# Patient Record
Sex: FEMALE | Race: WHITE | Employment: FULL TIME | URBAN - METROPOLITAN AREA
[De-identification: names, ages, dates, MRNs, and addresses within clinical notes are randomized per-mention and may not be internally consistent; named-entity substitution may affect disease eponyms.]

---

## 2017-05-08 ENCOUNTER — OFFICE VISIT (OUTPATIENT)
Dept: OBGYN CLINIC | Facility: CLINIC | Age: 27
End: 2017-05-08

## 2017-05-08 ENCOUNTER — LAB ENCOUNTER (OUTPATIENT)
Dept: LAB | Age: 27
End: 2017-05-08
Attending: OBSTETRICS & GYNECOLOGY
Payer: COMMERCIAL

## 2017-05-08 VITALS
HEART RATE: 96 BPM | BODY MASS INDEX: 25.03 KG/M2 | DIASTOLIC BLOOD PRESSURE: 54 MMHG | WEIGHT: 136 LBS | SYSTOLIC BLOOD PRESSURE: 116 MMHG | HEIGHT: 62 IN

## 2017-05-08 DIAGNOSIS — N92.6 MISSED MENSES: Primary | ICD-10-CM

## 2017-05-08 DIAGNOSIS — Z34.01 ENCOUNTER FOR SUPERVISION OF NORMAL FIRST PREGNANCY IN FIRST TRIMESTER: ICD-10-CM

## 2017-05-08 DIAGNOSIS — N92.6 MISSED MENSES: ICD-10-CM

## 2017-05-08 PROBLEM — Z34.00 SUPERVISION OF NORMAL FIRST PREGNANCY: Status: ACTIVE | Noted: 2017-05-08

## 2017-05-08 PROBLEM — Z34.00 SUPERVISION OF NORMAL FIRST PREGNANCY (HCC): Status: ACTIVE | Noted: 2017-05-08

## 2017-05-08 PROCEDURE — 87389 HIV-1 AG W/HIV-1&-2 AB AG IA: CPT | Performed by: OBSTETRICS & GYNECOLOGY

## 2017-05-08 PROCEDURE — 81002 URINALYSIS NONAUTO W/O SCOPE: CPT | Performed by: OBSTETRICS & GYNECOLOGY

## 2017-05-08 PROCEDURE — 85025 COMPLETE CBC W/AUTO DIFF WBC: CPT | Performed by: OBSTETRICS & GYNECOLOGY

## 2017-05-08 PROCEDURE — 87340 HEPATITIS B SURFACE AG IA: CPT | Performed by: OBSTETRICS & GYNECOLOGY

## 2017-05-08 PROCEDURE — 81025 URINE PREGNANCY TEST: CPT | Performed by: OBSTETRICS & GYNECOLOGY

## 2017-05-08 PROCEDURE — 86901 BLOOD TYPING SEROLOGIC RH(D): CPT | Performed by: OBSTETRICS & GYNECOLOGY

## 2017-05-08 PROCEDURE — 86850 RBC ANTIBODY SCREEN: CPT | Performed by: OBSTETRICS & GYNECOLOGY

## 2017-05-08 PROCEDURE — 86780 TREPONEMA PALLIDUM: CPT | Performed by: OBSTETRICS & GYNECOLOGY

## 2017-05-08 PROCEDURE — 88175 CYTOPATH C/V AUTO FLUID REDO: CPT | Performed by: OBSTETRICS & GYNECOLOGY

## 2017-05-08 PROCEDURE — 86762 RUBELLA ANTIBODY: CPT | Performed by: OBSTETRICS & GYNECOLOGY

## 2017-05-08 RX ORDER — ESCITALOPRAM OXALATE 10 MG/1
TABLET ORAL
Refills: 2 | COMMUNITY
Start: 2017-04-19 | End: 2018-06-06

## 2017-05-08 NOTE — PROGRESS NOTES
Patient desires genetic testing will schedule for a Pap and about 6 weeks she will come back in a month.     She is a history of UTIs and pyelonephritis had her wisdom teeth removed as a grandfather with a stroke her mother had preeclampsia and now has hype

## 2017-06-08 ENCOUNTER — OFFICE VISIT (OUTPATIENT)
Dept: OBGYN CLINIC | Facility: CLINIC | Age: 27
End: 2017-06-08

## 2017-06-08 VITALS
SYSTOLIC BLOOD PRESSURE: 122 MMHG | BODY MASS INDEX: 25.4 KG/M2 | WEIGHT: 138 LBS | DIASTOLIC BLOOD PRESSURE: 74 MMHG | HEIGHT: 62 IN

## 2017-06-08 DIAGNOSIS — Z34.01 ENCOUNTER FOR SUPERVISION OF NORMAL FIRST PREGNANCY IN FIRST TRIMESTER: Primary | ICD-10-CM

## 2017-06-08 DIAGNOSIS — Z13.79 ENCOUNTER FOR GENETIC SCREENING: ICD-10-CM

## 2017-06-08 PROCEDURE — 76816 OB US FOLLOW-UP PER FETUS: CPT | Performed by: OBSTETRICS & GYNECOLOGY

## 2017-06-08 NOTE — PROGRESS NOTES
JOSÉ ANTONIO-A' tired. No fetal movement. Nausea and vomiting today only. No fetal movement. Cystic fibrosis testing today.   Ultrasound with viable pregnancy

## 2017-06-22 NOTE — PROGRESS NOTES
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

## 2017-06-23 ENCOUNTER — TELEPHONE (OUTPATIENT)
Dept: OBGYN CLINIC | Facility: CLINIC | Age: 27
End: 2017-06-23

## 2017-06-23 ENCOUNTER — ULTRASOUND ENCOUNTER (OUTPATIENT)
Dept: OBGYN CLINIC | Facility: CLINIC | Age: 27
End: 2017-06-23

## 2017-06-23 DIAGNOSIS — Z34.01 ENCOUNTER FOR SUPERVISION OF NORMAL FIRST PREGNANCY IN FIRST TRIMESTER: ICD-10-CM

## 2017-06-23 DIAGNOSIS — O09.511 ADVANCED MATERNAL AGE, 1ST PREGNANCY, FIRST TRIMESTER: Primary | ICD-10-CM

## 2017-06-23 DIAGNOSIS — Z3A.14 14 WEEKS GESTATION OF PREGNANCY: Primary | ICD-10-CM

## 2017-06-23 PROCEDURE — 76805 OB US >/= 14 WKS SNGL FETUS: CPT | Performed by: OBSTETRICS & GYNECOLOGY

## 2017-06-27 NOTE — TELEPHONE ENCOUNTER
Returned pt's call regarding the name of genetic counselor Tressa Borrego [phone]  Pt would like for a call back to repeat the abnormal results to her, please

## 2017-06-27 NOTE — TELEPHONE ENCOUNTER
Patient requests the test results to be mail to her home address. Also, patient informed that she does need repeat Counsyl blood work for Informed Pregnancy Screen during next visit. Patient verbalizes understanding.

## 2017-07-08 ENCOUNTER — OFFICE VISIT (OUTPATIENT)
Dept: OBGYN CLINIC | Facility: CLINIC | Age: 27
End: 2017-07-08

## 2017-07-08 VITALS
SYSTOLIC BLOOD PRESSURE: 110 MMHG | HEIGHT: 62 IN | WEIGHT: 139 LBS | DIASTOLIC BLOOD PRESSURE: 60 MMHG | BODY MASS INDEX: 25.58 KG/M2

## 2017-07-08 DIAGNOSIS — Z34.02 ENCOUNTER FOR SUPERVISION OF NORMAL FIRST PREGNANCY IN SECOND TRIMESTER: Primary | ICD-10-CM

## 2017-07-08 RX ORDER — LISDEXAMFETAMINE DIMESYLATE 50 MG
CAPSULE ORAL
Refills: 0 | COMMUNITY
Start: 2017-05-05 | End: 2017-07-08

## 2017-07-10 ENCOUNTER — MED REC SCAN ONLY (OUTPATIENT)
Dept: OBGYN CLINIC | Facility: CLINIC | Age: 27
End: 2017-07-10

## 2017-08-08 ENCOUNTER — APPOINTMENT (OUTPATIENT)
Dept: OBGYN CLINIC | Facility: CLINIC | Age: 27
End: 2017-08-08

## 2017-08-08 ENCOUNTER — OFFICE VISIT (OUTPATIENT)
Dept: OBGYN CLINIC | Facility: CLINIC | Age: 27
End: 2017-08-08

## 2017-08-08 VITALS
SYSTOLIC BLOOD PRESSURE: 110 MMHG | HEIGHT: 62 IN | BODY MASS INDEX: 26.87 KG/M2 | DIASTOLIC BLOOD PRESSURE: 52 MMHG | WEIGHT: 146 LBS

## 2017-08-08 DIAGNOSIS — Z34.02 ENCOUNTER FOR SUPERVISION OF NORMAL FIRST PREGNANCY IN SECOND TRIMESTER: Primary | ICD-10-CM

## 2017-08-08 DIAGNOSIS — IMO0002 EVALUATE ANATOMY NOT SEEN ON PRIOR SONOGRAM: Primary | ICD-10-CM

## 2017-08-08 PROCEDURE — 76805 OB US >/= 14 WKS SNGL FETUS: CPT | Performed by: OBSTETRICS & GYNECOLOGY

## 2017-08-08 NOTE — PROGRESS NOTES
Ultrasound 19 weeks 5 days cervical length 3.1 suboptimal heart views. MFM consult for heart views. Prepared childbirth and breast-feeding classes were discussed. She will be here for sugar test next month.

## 2017-08-11 ENCOUNTER — MED REC SCAN ONLY (OUTPATIENT)
Dept: OBGYN CLINIC | Facility: CLINIC | Age: 27
End: 2017-08-11

## 2017-08-17 ENCOUNTER — OFFICE VISIT (OUTPATIENT)
Dept: PERINATAL CARE | Facility: HOSPITAL | Age: 27
End: 2017-08-17
Attending: OBSTETRICS & GYNECOLOGY
Payer: COMMERCIAL

## 2017-08-17 VITALS
HEART RATE: 76 BPM | DIASTOLIC BLOOD PRESSURE: 60 MMHG | WEIGHT: 146 LBS | SYSTOLIC BLOOD PRESSURE: 112 MMHG | BODY MASS INDEX: 27 KG/M2

## 2017-08-17 DIAGNOSIS — O28.3 FETAL ECHOGENIC INTRACARDIAC FOCUS ON PRENATAL ULTRASOUND: Primary | ICD-10-CM

## 2017-08-17 PROCEDURE — 76811 OB US DETAILED SNGL FETUS: CPT

## 2017-08-17 PROCEDURE — 99243 OFF/OP CNSLTJ NEW/EST LOW 30: CPT

## 2017-08-17 NOTE — PROGRESS NOTES
Outpatient Maternal-Fetal Medicine Consultation    Dear Dr. Arlena Hatchet,    Thank you for requesting ultrasound evaluation and maternal fetal medicine consultation on your patient Dawood Franco.   As you are aware she is a 24year old female with a Nam p detailed report. History: Age: 24 years.   ____________________________________________________________________________  Dating:  LMP: 03/25/2017 EDC: 12/30/2017 GA by LMP: 20w5d  Current Scan on: 08/17/2017 EDC: 12/24/2017 GA by current scan: 21w4d  The for aneuploidy are seen. The limitations of ultrasound were discussed. The patient understands that ultrasound cannot rule out all structural and chromosomal abnormalities.     ____________________________________________________________________________  [facility] Please do not hesitate to contact me if additional questions or concerns arise. Manuela Geller M.D. The majority of the time (>50%) was spent in review of records, consultation and coordination of care. Our discussion is summarized above.  The approxi

## 2017-09-05 ENCOUNTER — LAB ENCOUNTER (OUTPATIENT)
Dept: LAB | Age: 27
End: 2017-09-05
Attending: OBSTETRICS & GYNECOLOGY
Payer: COMMERCIAL

## 2017-09-05 ENCOUNTER — OFFICE VISIT (OUTPATIENT)
Dept: OBGYN CLINIC | Facility: CLINIC | Age: 27
End: 2017-09-05

## 2017-09-05 VITALS
BODY MASS INDEX: 28.16 KG/M2 | SYSTOLIC BLOOD PRESSURE: 116 MMHG | WEIGHT: 153 LBS | HEIGHT: 62 IN | DIASTOLIC BLOOD PRESSURE: 60 MMHG

## 2017-09-05 DIAGNOSIS — O28.3 FETAL ECHOGENIC INTRACARDIAC FOCUS ON PRENATAL ULTRASOUND: ICD-10-CM

## 2017-09-05 DIAGNOSIS — Z34.02 ENCOUNTER FOR SUPERVISION OF NORMAL FIRST PREGNANCY IN SECOND TRIMESTER: Primary | ICD-10-CM

## 2017-09-05 DIAGNOSIS — Z34.02 ENCOUNTER FOR SUPERVISION OF NORMAL FIRST PREGNANCY IN SECOND TRIMESTER: ICD-10-CM

## 2017-09-05 DIAGNOSIS — Z23 NEED FOR VACCINATION: ICD-10-CM

## 2017-09-05 PROBLEM — F41.9 ANXIETY: Status: ACTIVE | Noted: 2017-09-05

## 2017-09-05 LAB
BASOPHILS # BLD AUTO: 0.04 X10(3) UL (ref 0–0.1)
BASOPHILS NFR BLD AUTO: 0.3 %
EOSINOPHIL # BLD AUTO: 0.17 X10(3) UL (ref 0–0.3)
EOSINOPHIL NFR BLD AUTO: 1.3 %
ERYTHROCYTE [DISTWIDTH] IN BLOOD BY AUTOMATED COUNT: 12.5 % (ref 11.5–16)
GLUCOSE 1H P GLC SERPL-MCNC: 80 MG/DL
HCT VFR BLD AUTO: 37.2 % (ref 34–50)
HGB BLD-MCNC: 12.4 G/DL (ref 12–16)
IMMATURE GRANULOCYTE COUNT: 0.06 X10(3) UL (ref 0–1)
IMMATURE GRANULOCYTE RATIO %: 0.5 %
LYMPHOCYTES # BLD AUTO: 3.34 X10(3) UL (ref 0.9–4)
LYMPHOCYTES NFR BLD AUTO: 25.7 %
MCH RBC QN AUTO: 29.4 PG (ref 27–33.2)
MCHC RBC AUTO-ENTMCNC: 33.3 G/DL (ref 31–37)
MCV RBC AUTO: 88.2 FL (ref 81–100)
MONOCYTES # BLD AUTO: 1.12 X10(3) UL (ref 0.1–0.6)
MONOCYTES NFR BLD AUTO: 8.6 %
NEUTROPHIL ABS PRELIM: 8.27 X10 (3) UL (ref 1.3–6.7)
NEUTROPHILS # BLD AUTO: 8.27 X10(3) UL (ref 1.3–6.7)
NEUTROPHILS NFR BLD AUTO: 63.6 %
PLATELET # BLD AUTO: 369 10(3)UL (ref 150–450)
RBC # BLD AUTO: 4.22 X10(6)UL (ref 3.8–5.1)
RED CELL DISTRIBUTION WIDTH-SD: 40.1 FL (ref 35.1–46.3)
WBC # BLD AUTO: 13 X10(3) UL (ref 4–13)

## 2017-09-05 PROCEDURE — 82950 GLUCOSE TEST: CPT | Performed by: OBSTETRICS & GYNECOLOGY

## 2017-09-05 PROCEDURE — 90471 IMMUNIZATION ADMIN: CPT | Performed by: OBSTETRICS & GYNECOLOGY

## 2017-09-05 PROCEDURE — 85025 COMPLETE CBC W/AUTO DIFF WBC: CPT | Performed by: OBSTETRICS & GYNECOLOGY

## 2017-09-05 PROCEDURE — 90686 IIV4 VACC NO PRSV 0.5 ML IM: CPT | Performed by: OBSTETRICS & GYNECOLOGY

## 2017-09-05 NOTE — PROGRESS NOTES
Breast-feeding and Lamaze were discussed. She is doing her glucose today. Circumcision discussed. She will get a flu shot today.

## 2017-09-11 ENCOUNTER — MED REC SCAN ONLY (OUTPATIENT)
Dept: OBGYN CLINIC | Facility: CLINIC | Age: 27
End: 2017-09-11

## 2017-09-22 ENCOUNTER — NURSE ONLY (OUTPATIENT)
Dept: OBGYN CLINIC | Facility: CLINIC | Age: 27
End: 2017-09-22

## 2017-09-22 DIAGNOSIS — N39.0 URINARY TRACT INFECTION WITHOUT HEMATURIA, SITE UNSPECIFIED: Primary | ICD-10-CM

## 2017-09-22 LAB
APPEARANCE: CLEAR
MULTISTIX LOT#: NORMAL NUMERIC
NITRITE, URINE: POSITIVE
OCCULT BLOOD: NEGATIVE
PH, URINE: 6.5 (ref 4.5–8)
SPECIFIC GRAVITY: 1.02 (ref 1–1.03)
URINE-COLOR: YELLOW
UROBILINOGEN,SEMI-QN: 0.2 MG/DL (ref 0–1.9)

## 2017-09-22 PROCEDURE — 87086 URINE CULTURE/COLONY COUNT: CPT | Performed by: OBSTETRICS & GYNECOLOGY

## 2017-09-22 PROCEDURE — 81002 URINALYSIS NONAUTO W/O SCOPE: CPT | Performed by: OBSTETRICS & GYNECOLOGY

## 2017-09-22 PROCEDURE — 99212 OFFICE O/P EST SF 10 MIN: CPT | Performed by: OBSTETRICS & GYNECOLOGY

## 2017-09-22 RX ORDER — NITROFURANTOIN 25; 75 MG/1; MG/1
100 CAPSULE ORAL 2 TIMES DAILY
Qty: 14 CAPSULE | Refills: 0 | Status: SHIPPED | OUTPATIENT
Start: 2017-09-22 | End: 2017-10-31

## 2017-10-03 ENCOUNTER — OFFICE VISIT (OUTPATIENT)
Dept: OBGYN CLINIC | Facility: CLINIC | Age: 27
End: 2017-10-03

## 2017-10-03 VITALS
SYSTOLIC BLOOD PRESSURE: 116 MMHG | WEIGHT: 156 LBS | BODY MASS INDEX: 28.71 KG/M2 | HEIGHT: 62 IN | DIASTOLIC BLOOD PRESSURE: 66 MMHG

## 2017-10-03 DIAGNOSIS — Z34.03 ENCOUNTER FOR SUPERVISION OF NORMAL FIRST PREGNANCY IN THIRD TRIMESTER: Primary | ICD-10-CM

## 2017-10-03 PROCEDURE — 90715 TDAP VACCINE 7 YRS/> IM: CPT | Performed by: OBSTETRICS & GYNECOLOGY

## 2017-10-03 PROCEDURE — 90471 IMMUNIZATION ADMIN: CPT | Performed by: OBSTETRICS & GYNECOLOGY

## 2017-10-03 NOTE — PROGRESS NOTES
Urine culture negative at 18-24 hours. Rh+.flu  Shot has been giv  Desires circumcision. Tdap given. 2 weeksen.

## 2017-10-17 ENCOUNTER — OFFICE VISIT (OUTPATIENT)
Dept: OBGYN CLINIC | Facility: CLINIC | Age: 27
End: 2017-10-17

## 2017-10-17 VITALS
HEIGHT: 62 IN | SYSTOLIC BLOOD PRESSURE: 116 MMHG | WEIGHT: 161 LBS | DIASTOLIC BLOOD PRESSURE: 68 MMHG | BODY MASS INDEX: 29.63 KG/M2

## 2017-10-17 DIAGNOSIS — Z34.03 ENCOUNTER FOR SUPERVISION OF NORMAL FIRST PREGNANCY IN THIRD TRIMESTER: Primary | ICD-10-CM

## 2017-10-17 NOTE — PROGRESS NOTES
Various pediatricians. Understands she needs a car seat preregistration was discussed. classes were discussed.

## 2017-10-25 NOTE — PROGRESS NOTES
Georgia Mustafa    Dear Dr. Joel Booth    Thank you for requesting ultrasound evaluation and maternal fetal medicine consultation on your patient Arianne Sequeira.   As you are aware she is a 24year old female  with a Singlet Fetal Anatomy:  Visualized with normal appearance: 4 chamber heart and great vessels, bladder. Brain: Visualized and normal appearance: cerebellum. Gastrointestinal Tract: stomach visible. Summary of Ultrasound Findings:  Impression:  The fetal g

## 2017-10-26 ENCOUNTER — OFFICE VISIT (OUTPATIENT)
Dept: PERINATAL CARE | Facility: HOSPITAL | Age: 27
End: 2017-10-26
Attending: OBSTETRICS & GYNECOLOGY
Payer: COMMERCIAL

## 2017-10-26 VITALS
HEART RATE: 73 BPM | BODY MASS INDEX: 29 KG/M2 | WEIGHT: 161 LBS | DIASTOLIC BLOOD PRESSURE: 64 MMHG | SYSTOLIC BLOOD PRESSURE: 116 MMHG

## 2017-10-26 DIAGNOSIS — O28.3 FETAL ECHOGENIC INTRACARDIAC FOCUS ON PRENATAL ULTRASOUND: ICD-10-CM

## 2017-10-26 PROCEDURE — 76805 OB US >/= 14 WKS SNGL FETUS: CPT | Performed by: OBSTETRICS & GYNECOLOGY

## 2017-10-26 PROCEDURE — 76819 FETAL BIOPHYS PROFIL W/O NST: CPT | Performed by: OBSTETRICS & GYNECOLOGY

## 2017-10-26 PROCEDURE — 99214 OFFICE O/P EST MOD 30 MIN: CPT | Performed by: OBSTETRICS & GYNECOLOGY

## 2017-10-31 ENCOUNTER — OFFICE VISIT (OUTPATIENT)
Dept: OBGYN CLINIC | Facility: CLINIC | Age: 27
End: 2017-10-31

## 2017-10-31 VITALS
HEIGHT: 62 IN | SYSTOLIC BLOOD PRESSURE: 126 MMHG | DIASTOLIC BLOOD PRESSURE: 62 MMHG | WEIGHT: 161 LBS | BODY MASS INDEX: 29.63 KG/M2

## 2017-10-31 DIAGNOSIS — Z34.03 ENCOUNTER FOR SUPERVISION OF NORMAL FIRST PREGNANCY IN THIRD TRIMESTER: Primary | ICD-10-CM

## 2017-11-14 ENCOUNTER — OFFICE VISIT (OUTPATIENT)
Dept: OBGYN CLINIC | Facility: CLINIC | Age: 27
End: 2017-11-14

## 2017-11-14 VITALS
WEIGHT: 165 LBS | SYSTOLIC BLOOD PRESSURE: 120 MMHG | HEIGHT: 62 IN | DIASTOLIC BLOOD PRESSURE: 58 MMHG | BODY MASS INDEX: 30.36 KG/M2

## 2017-11-14 DIAGNOSIS — Z34.03 ENCOUNTER FOR SUPERVISION OF NORMAL FIRST PREGNANCY IN THIRD TRIMESTER: Primary | ICD-10-CM

## 2017-11-28 ENCOUNTER — OFFICE VISIT (OUTPATIENT)
Dept: OBGYN CLINIC | Facility: CLINIC | Age: 27
End: 2017-11-28

## 2017-11-28 VITALS
SYSTOLIC BLOOD PRESSURE: 122 MMHG | BODY MASS INDEX: 30.73 KG/M2 | HEIGHT: 62 IN | DIASTOLIC BLOOD PRESSURE: 68 MMHG | WEIGHT: 167 LBS

## 2017-11-28 DIAGNOSIS — Z34.03 ENCOUNTER FOR SUPERVISION OF NORMAL FIRST PREGNANCY IN THIRD TRIMESTER: Primary | ICD-10-CM

## 2017-11-28 PROCEDURE — 87081 CULTURE SCREEN ONLY: CPT | Performed by: OBSTETRICS & GYNECOLOGY

## 2017-11-28 PROCEDURE — 87653 STREP B DNA AMP PROBE: CPT | Performed by: OBSTETRICS & GYNECOLOGY

## 2017-12-05 ENCOUNTER — OFFICE VISIT (OUTPATIENT)
Dept: OBGYN CLINIC | Facility: CLINIC | Age: 27
End: 2017-12-05

## 2017-12-05 VITALS
DIASTOLIC BLOOD PRESSURE: 60 MMHG | BODY MASS INDEX: 31.1 KG/M2 | SYSTOLIC BLOOD PRESSURE: 124 MMHG | WEIGHT: 169 LBS | HEIGHT: 62 IN

## 2017-12-05 DIAGNOSIS — Z34.03 ENCOUNTER FOR SUPERVISION OF NORMAL FIRST PREGNANCY IN THIRD TRIMESTER: Primary | ICD-10-CM

## 2017-12-12 ENCOUNTER — OFFICE VISIT (OUTPATIENT)
Dept: OBGYN CLINIC | Facility: CLINIC | Age: 27
End: 2017-12-12

## 2017-12-12 VITALS
HEIGHT: 62 IN | WEIGHT: 171 LBS | BODY MASS INDEX: 31.47 KG/M2 | SYSTOLIC BLOOD PRESSURE: 102 MMHG | DIASTOLIC BLOOD PRESSURE: 62 MMHG

## 2017-12-12 DIAGNOSIS — O28.3 FETAL ECHOGENIC INTRACARDIAC FOCUS ON PRENATAL ULTRASOUND: Primary | ICD-10-CM

## 2017-12-16 ENCOUNTER — HOSPITAL ENCOUNTER (INPATIENT)
Facility: HOSPITAL | Age: 27
LOS: 2 days | Discharge: HOME | End: 2017-12-18
Attending: OBSTETRICS & GYNECOLOGY | Admitting: OBSTETRICS & GYNECOLOGY
Payer: COMMERCIAL

## 2017-12-16 ENCOUNTER — ANESTHESIA (OUTPATIENT)
Dept: OBGYN UNIT | Facility: HOSPITAL | Age: 27
End: 2017-12-16
Payer: COMMERCIAL

## 2017-12-16 ENCOUNTER — ANESTHESIA EVENT (OUTPATIENT)
Dept: OBGYN UNIT | Facility: HOSPITAL | Age: 27
End: 2017-12-16
Payer: COMMERCIAL

## 2017-12-16 ENCOUNTER — SURGERY (OUTPATIENT)
Age: 27
End: 2017-12-16

## 2017-12-16 PROBLEM — Z34.90 PREGNANCY: Status: ACTIVE | Noted: 2017-12-16

## 2017-12-16 PROBLEM — Z34.90 PREGNANCY (HCC): Status: ACTIVE | Noted: 2017-12-16

## 2017-12-16 PROCEDURE — 59514 CESAREAN DELIVERY ONLY: CPT | Performed by: OBSTETRICS & GYNECOLOGY

## 2017-12-16 PROCEDURE — 59510 CESAREAN DELIVERY: CPT | Performed by: OBSTETRICS & GYNECOLOGY

## 2017-12-16 RX ORDER — CLINDAMYCIN PHOSPHATE 900 MG/50ML
900 INJECTION INTRAVENOUS ONCE
Status: DISCONTINUED | OUTPATIENT
Start: 2017-12-16 | End: 2017-12-16

## 2017-12-16 RX ORDER — DEXTROSE, SODIUM CHLORIDE, SODIUM LACTATE, POTASSIUM CHLORIDE, AND CALCIUM CHLORIDE 5; .6; .31; .03; .02 G/100ML; G/100ML; G/100ML; G/100ML; G/100ML
INJECTION, SOLUTION INTRAVENOUS CONTINUOUS
Status: DISCONTINUED | OUTPATIENT
Start: 2017-12-16 | End: 2017-12-18

## 2017-12-16 RX ORDER — GENTAMICIN SULFATE 40 MG/ML
5 INJECTION, SOLUTION INTRAMUSCULAR; INTRAVENOUS ONCE
Status: DISCONTINUED | OUTPATIENT
Start: 2017-12-16 | End: 2017-12-18

## 2017-12-16 RX ORDER — GENTAMICIN SULFATE 40 MG/ML
5 INJECTION, SOLUTION INTRAMUSCULAR; INTRAVENOUS ONCE
Status: DISCONTINUED | OUTPATIENT
Start: 2017-12-16 | End: 2017-12-16

## 2017-12-16 RX ORDER — GENTAMICIN SULFATE 40 MG/ML
INJECTION, SOLUTION INTRAMUSCULAR; INTRAVENOUS
Status: DISCONTINUED
Start: 2017-12-16 | End: 2017-12-16

## 2017-12-16 RX ORDER — HYDROMORPHONE HYDROCHLORIDE 1 MG/ML
0.4 INJECTION, SOLUTION INTRAMUSCULAR; INTRAVENOUS; SUBCUTANEOUS EVERY 2 HOUR PRN
Status: DISPENSED | OUTPATIENT
Start: 2017-12-16 | End: 2017-12-17

## 2017-12-16 RX ORDER — MORPHINE SULFATE 0.5 MG/ML
2 INJECTION, SOLUTION EPIDURAL; INTRATHECAL; INTRAVENOUS ONCE
Status: DISCONTINUED | OUTPATIENT
Start: 2017-12-16 | End: 2017-12-18

## 2017-12-16 RX ORDER — HYDROCODONE BITARTRATE AND ACETAMINOPHEN 5; 325 MG/1; MG/1
1 TABLET ORAL EVERY 4 HOURS PRN
Status: DISCONTINUED | OUTPATIENT
Start: 2017-12-16 | End: 2017-12-18

## 2017-12-16 RX ORDER — DEXTROSE, SODIUM CHLORIDE, SODIUM LACTATE, POTASSIUM CHLORIDE, AND CALCIUM CHLORIDE 5; .6; .31; .03; .02 G/100ML; G/100ML; G/100ML; G/100ML; G/100ML
INJECTION, SOLUTION INTRAVENOUS AS NEEDED
Status: DISCONTINUED | OUTPATIENT
Start: 2017-12-16 | End: 2017-12-18

## 2017-12-16 RX ORDER — KETOROLAC TROMETHAMINE 30 MG/ML
30 INJECTION, SOLUTION INTRAMUSCULAR; INTRAVENOUS EVERY 6 HOURS PRN
Status: COMPLETED | OUTPATIENT
Start: 2017-12-16 | End: 2017-12-17

## 2017-12-16 RX ORDER — DIPHENHYDRAMINE HYDROCHLORIDE 50 MG/ML
12.5 INJECTION INTRAMUSCULAR; INTRAVENOUS EVERY 4 HOURS PRN
Status: DISCONTINUED | OUTPATIENT
Start: 2017-12-16 | End: 2017-12-18

## 2017-12-16 RX ORDER — NALOXONE HYDROCHLORIDE 0.4 MG/ML
0.08 INJECTION, SOLUTION INTRAMUSCULAR; INTRAVENOUS; SUBCUTANEOUS
Status: ACTIVE | OUTPATIENT
Start: 2017-12-16 | End: 2017-12-17

## 2017-12-16 RX ORDER — IBUPROFEN 600 MG/1
600 TABLET ORAL ONCE AS NEEDED
Status: DISCONTINUED | OUTPATIENT
Start: 2017-12-16 | End: 2017-12-18

## 2017-12-16 RX ORDER — ONDANSETRON 2 MG/ML
4 INJECTION INTRAMUSCULAR; INTRAVENOUS ONCE AS NEEDED
Status: DISCONTINUED | OUTPATIENT
Start: 2017-12-16 | End: 2017-12-16 | Stop reason: HOSPADM

## 2017-12-16 RX ORDER — TRISODIUM CITRATE DIHYDRATE AND CITRIC ACID MONOHYDRATE 500; 334 MG/5ML; MG/5ML
30 SOLUTION ORAL ONCE
Status: COMPLETED | OUTPATIENT
Start: 2017-12-16 | End: 2017-12-16

## 2017-12-16 RX ORDER — DOCUSATE SODIUM 100 MG/1
100 CAPSULE, LIQUID FILLED ORAL
Status: DISCONTINUED | OUTPATIENT
Start: 2017-12-17 | End: 2017-12-18

## 2017-12-16 RX ORDER — KETOROLAC TROMETHAMINE 30 MG/ML
30 INJECTION, SOLUTION INTRAMUSCULAR; INTRAVENOUS EVERY 6 HOURS PRN
Status: DISPENSED | OUTPATIENT
Start: 2017-12-16 | End: 2017-12-17

## 2017-12-16 RX ORDER — SODIUM CHLORIDE, SODIUM LACTATE, POTASSIUM CHLORIDE, CALCIUM CHLORIDE 600; 310; 30; 20 MG/100ML; MG/100ML; MG/100ML; MG/100ML
INJECTION, SOLUTION INTRAVENOUS CONTINUOUS
Status: DISCONTINUED | OUTPATIENT
Start: 2017-12-16 | End: 2017-12-18

## 2017-12-16 RX ORDER — DIPHENHYDRAMINE HCL 25 MG
25 CAPSULE ORAL EVERY 4 HOURS PRN
Status: DISCONTINUED | OUTPATIENT
Start: 2017-12-16 | End: 2017-12-18

## 2017-12-16 RX ORDER — BISACODYL 10 MG
10 SUPPOSITORY, RECTAL RECTAL
Status: DISCONTINUED | OUTPATIENT
Start: 2017-12-16 | End: 2017-12-18

## 2017-12-16 RX ORDER — SODIUM CHLORIDE 9 MG/ML
100 INJECTION, SOLUTION INTRAVENOUS ONCE
Status: DISCONTINUED | OUTPATIENT
Start: 2017-12-16 | End: 2017-12-16

## 2017-12-16 RX ORDER — TERBUTALINE SULFATE 1 MG/ML
0.25 INJECTION, SOLUTION SUBCUTANEOUS AS NEEDED
Status: DISCONTINUED | OUTPATIENT
Start: 2017-12-16 | End: 2017-12-18

## 2017-12-16 RX ORDER — CLINDAMYCIN PHOSPHATE 900 MG/50ML
900 INJECTION INTRAVENOUS ONCE
Status: DISCONTINUED | OUTPATIENT
Start: 2017-12-16 | End: 2017-12-18

## 2017-12-16 RX ORDER — ESCITALOPRAM OXALATE 10 MG/1
10 TABLET ORAL EVERY MORNING
Status: DISCONTINUED | OUTPATIENT
Start: 2017-12-16 | End: 2017-12-18

## 2017-12-16 RX ORDER — DIPHENHYDRAMINE HYDROCHLORIDE 50 MG/ML
25 INJECTION INTRAMUSCULAR; INTRAVENOUS ONCE AS NEEDED
Status: DISCONTINUED | OUTPATIENT
Start: 2017-12-16 | End: 2017-12-16 | Stop reason: HOSPADM

## 2017-12-16 RX ORDER — EPHEDRINE SULFATE 50 MG/ML
5 INJECTION, SOLUTION INTRAVENOUS AS NEEDED
Status: DISCONTINUED | OUTPATIENT
Start: 2017-12-16 | End: 2017-12-18

## 2017-12-16 RX ORDER — KETOROLAC TROMETHAMINE 30 MG/ML
30 INJECTION, SOLUTION INTRAMUSCULAR; INTRAVENOUS ONCE AS NEEDED
Status: COMPLETED | OUTPATIENT
Start: 2017-12-16 | End: 2017-12-16

## 2017-12-16 RX ORDER — HYDROMORPHONE HYDROCHLORIDE 1 MG/ML
0.4 INJECTION, SOLUTION INTRAMUSCULAR; INTRAVENOUS; SUBCUTANEOUS EVERY 5 MIN PRN
Status: DISCONTINUED | OUTPATIENT
Start: 2017-12-16 | End: 2017-12-16 | Stop reason: HOSPADM

## 2017-12-16 RX ORDER — SODIUM CHLORIDE 9 MG/ML
100 INJECTION, SOLUTION INTRAVENOUS ONCE
Status: DISCONTINUED | OUTPATIENT
Start: 2017-12-16 | End: 2017-12-18

## 2017-12-16 RX ORDER — CLINDAMYCIN PHOSPHATE 900 MG/50ML
INJECTION INTRAVENOUS
Status: DISCONTINUED | OUTPATIENT
Start: 2017-12-16 | End: 2017-12-18

## 2017-12-16 RX ORDER — SIMETHICONE 80 MG
80 TABLET,CHEWABLE ORAL 3 TIMES DAILY PRN
Status: DISCONTINUED | OUTPATIENT
Start: 2017-12-16 | End: 2017-12-18

## 2017-12-16 RX ORDER — HYDROCODONE BITARTRATE AND ACETAMINOPHEN 10; 325 MG/1; MG/1
1 TABLET ORAL EVERY 4 HOURS PRN
Status: DISCONTINUED | OUTPATIENT
Start: 2017-12-16 | End: 2017-12-18

## 2017-12-16 RX ORDER — ZOLPIDEM TARTRATE 5 MG/1
5 TABLET ORAL NIGHTLY PRN
Status: DISCONTINUED | OUTPATIENT
Start: 2017-12-16 | End: 2017-12-18

## 2017-12-16 RX ORDER — NALBUPHINE HCL 10 MG/ML
2.5 AMPUL (ML) INJECTION
Status: DISCONTINUED | OUTPATIENT
Start: 2017-12-16 | End: 2017-12-18

## 2017-12-16 RX ORDER — ONDANSETRON 2 MG/ML
4 INJECTION INTRAMUSCULAR; INTRAVENOUS EVERY 6 HOURS PRN
Status: DISCONTINUED | OUTPATIENT
Start: 2017-12-16 | End: 2017-12-18

## 2017-12-16 RX ORDER — KETOROLAC TROMETHAMINE 30 MG/ML
INJECTION, SOLUTION INTRAMUSCULAR; INTRAVENOUS
Status: COMPLETED
Start: 2017-12-16 | End: 2017-12-16

## 2017-12-16 RX ORDER — NALBUPHINE HCL 10 MG/ML
2.5 AMPUL (ML) INJECTION
Status: DISCONTINUED | OUTPATIENT
Start: 2017-12-16 | End: 2017-12-16 | Stop reason: HOSPADM

## 2017-12-16 RX ORDER — NALBUPHINE HCL 10 MG/ML
2.5 AMPUL (ML) INJECTION EVERY 4 HOURS PRN
Status: DISCONTINUED | OUTPATIENT
Start: 2017-12-16 | End: 2017-12-18

## 2017-12-16 RX ORDER — GENTAMICIN SULFATE 80 MG/100ML
INJECTION, SOLUTION INTRAVENOUS
Status: DISCONTINUED | OUTPATIENT
Start: 2017-12-16 | End: 2017-12-18

## 2017-12-16 RX ORDER — TRISODIUM CITRATE DIHYDRATE AND CITRIC ACID MONOHYDRATE 500; 334 MG/5ML; MG/5ML
30 SOLUTION ORAL AS NEEDED
Status: DISCONTINUED | OUTPATIENT
Start: 2017-12-16 | End: 2017-12-18

## 2017-12-16 RX ORDER — IBUPROFEN 600 MG/1
600 TABLET ORAL EVERY 6 HOURS SCHEDULED
Status: DISCONTINUED | OUTPATIENT
Start: 2017-12-17 | End: 2017-12-18

## 2017-12-16 NOTE — H&P
[address] [facility] Patient Status:  Inpatient    5/3/1990 MRN [id number]   Location [facility] Attending Juan Desouza MD   Hosp Day # 0 PCP None Pcp     Subjective:   GiPo SROM . 4 cm.  Having c

## 2017-12-16 NOTE — PROGRESS NOTES
Patient admitted via cart  Safety precautions initiated. Bed in low position. Call light is within reach. Hugs and Kisses security tag in place on infant and patient. ID bands matched with infant and parents  Oriented to room.   Reviewed plan of care

## 2017-12-16 NOTE — PROGRESS NOTES
Deep variable decels 1 dose brethine given. 7 cm/-1 . Asynclitic. To C/S for fetal intolerance.  To labor

## 2017-12-16 NOTE — ANESTHESIA POSTPROCEDURE EVALUATION
[address] [facility] Patient Status:  Inpatient   Age/Gender 24year old female MRN [id number]   Location [address] [facility] Attending Norman Moya MD   Hosp Day # 0 PCP None Pcp       Anesthesia Post-op Note    Procedure(s):

## 2017-12-16 NOTE — PROGRESS NOTES
Pt is a 24year old female admitted to TRG2/TRG2-A. Patient presents with:  R/o Labor  R/o Rom     Pt is  38w0d intra-uterine pregnancy. History obtained, consents signed. Oriented to room, staff, and plan of care.

## 2017-12-16 NOTE — ANESTHESIA PREPROCEDURE EVALUATION
PRE-OP EVALUATION    Patient Name: Darrion Desouza    Pre-op Diagnosis: term intrauterine pregnancy in labor; fetal intolerance of labor    Procedure(s):      Surgeon(s) and Role:     * Kayley Jones MD - Primary     * Russel Huffman MD - Assisting Surgeon Cardiovascular                                                       Endo/Other                                  Pulmonary                           Neuro/Psych                     (+) psychiatric history               Past Surgical History:  No date: [place]

## 2017-12-16 NOTE — OPERATIVE REPORT
[facility]   Section - Operative Note    Katlin Lester Patient Status:  Inpatient    5/3/1990 MRN [id number]   Location [address] [place] [address] Attending Avalon MD Sonia   Hosp Day # 0 PCP None Pcp       Preoperative Diagnosis: warmer. The placenta was delivered intact with a 3 vessel cord. Uterus, tubes and ovaries were normal in appearance. The uterine cavity was swept clean using a wet lap. The cervix was dilated with a ring forcep which was then passed off of the field.

## 2017-12-16 NOTE — PROGRESS NOTES
Marline care done, pads/gown/panties changed. Transferred to mother baby in stable condition, holding baby, also in stable condition, via cart with RN, PCT and spouse. ID bands verified  By RN x2. Edith and kisses tagged. Report to Darlene Sanchez RN.

## 2017-12-16 NOTE — PLAN OF CARE
Problem: Patient/Family Goals  Goal: Patient/Family Long Term Goal  Patient's Long Term Goal: Adequate pain relief    Interventions:  - See additional Care Plan goals for specific interventions   Outcome: Progressing    Goal: Patient/Family Short Term Goal

## 2017-12-17 NOTE — PLAN OF CARE
GASTROINTESTINAL - ADULT    • Minimal or absence of nausea and vomiting Completed        SAFETY ADULT - FALL    • Free from fall injury Completed          GASTROINTESTINAL - ADULT    • Maintains or returns to baseline bowel function Progressing        POST

## 2017-12-17 NOTE — PROGRESS NOTES
[facility]  Post-Partum Caesarean Section Progress Note    Debra Martinez Patient Status:  Inpatient    5/3/1990 MRN [id number]   [facility] 2SW-J Attending Radha Smith MD   Hosp Day # 1 PCP None Pcp     SUBJECTIVE:    Postpartum D

## 2017-12-17 NOTE — PROGRESS NOTES
[facility]    Patients Name: Arianne Sequeira  Attending Physician: Loretta Mayfield MD  CSN: [id number]    Location:  2217/2217-A  MRN: [id number]    YOB: 1990  Admission Date: 12/16/2017     Obstetric Anesthesia Pain Progress Note    Post-O

## 2017-12-18 VITALS
HEART RATE: 92 BPM | RESPIRATION RATE: 19 BRPM | WEIGHT: 170 LBS | OXYGEN SATURATION: 98 % | DIASTOLIC BLOOD PRESSURE: 87 MMHG | HEIGHT: 62 IN | TEMPERATURE: 99 F | BODY MASS INDEX: 31.28 KG/M2 | SYSTOLIC BLOOD PRESSURE: 126 MMHG

## 2017-12-18 NOTE — PLAN OF CARE
GASTROINTESTINAL - ADULT    • Maintains or returns to baseline bowel function Progressing        POSTPARTUM    • Long Term Goal:Experiences normal postpartum course Progressing    • Optimize infant feeding at the breast Progressing    • Appropriate materna

## 2017-12-18 NOTE — PROGRESS NOTES
[facility]  Post-Partum Caesarean Section Progress Note    Cherylene Joon Patient Status:  Inpatient    5/3/1990 MRN [id number]   [facility] 2SW-J Attending Kashmir Ba MD   Hosp Day # 2 PCP None Pcp     SUBJECTIVE:    Postpartum D

## 2017-12-18 NOTE — PROGRESS NOTES
Discharge patient home as order. Teaching complete, patient feel comfortable to take care herself and  infant. Hugs and kisses off. Sent both mom and infant to their family car @ [phone].

## 2017-12-18 NOTE — CM/SW NOTE
12/18/17 1000   CM/SW Referral Data   Referral Source Nurse;Family; Social Work (self-referral)   Reason for Referral Discharge planning;Psychoscial assessment   Informant Patient     SW order placed to identify needs and provide support and services.   P

## 2017-12-20 ENCOUNTER — TELEPHONE (OUTPATIENT)
Dept: OBGYN UNIT | Facility: HOSPITAL | Age: 27
End: 2017-12-20

## 2017-12-21 ENCOUNTER — TELEPHONE (OUTPATIENT)
Dept: OBGYN UNIT | Facility: HOSPITAL | Age: 27
End: 2017-12-21

## 2017-12-22 NOTE — PAYOR COMM NOTE
--------------  DISCHARGE REVIEW    Payor: BRAULIO ANTOINE  Subscriber #:  [id number]  Authorization Number: [id number]    Admit date: 12/16/17  Admit time:  1015  Discharge Date: 12/18/2017  1:15 PM     Admitting Physician: Rossi Paul MD  Attending Physici

## 2017-12-22 NOTE — PAYOR COMM NOTE
--------------  ADMISSION REVIEW     Payor: BRAULIO ANTOINE  Subscriber #:  [id number]  Authorization Number: [id number]    Admit date: 12/16/17  Admit time: [address]       Admitting Physician: Tammy Sousa MD  Attending Physician:  No att. providers found  Shena Rboerts Sue     Assistant:  bigg     Indications:  Fetal intolerance to labor     Surgical Findings: Baby:  male      Weight:  7-2     APGAR 1': 9  APGAR 5': 9        Anesthesia: epidural     EBL: 500cc

## 2018-01-04 ENCOUNTER — MED REC SCAN ONLY (OUTPATIENT)
Dept: OBGYN CLINIC | Facility: CLINIC | Age: 28
End: 2018-01-04

## 2018-01-20 ENCOUNTER — OFFICE VISIT (OUTPATIENT)
Dept: OBGYN CLINIC | Facility: CLINIC | Age: 28
End: 2018-01-20

## 2018-01-20 VITALS
BODY MASS INDEX: 28.34 KG/M2 | DIASTOLIC BLOOD PRESSURE: 64 MMHG | HEART RATE: 72 BPM | WEIGHT: 154 LBS | HEIGHT: 62 IN | SYSTOLIC BLOOD PRESSURE: 112 MMHG

## 2018-01-20 NOTE — PROGRESS NOTES
MELINA    Francois Mata is a 24year old female  here for 6 week post-partum visit. Patient delivered a  male infant on 17 via primary CSx. Patient desires IUD for contraception. Patient is breast feeding.    Patient denies symptoms of depres without lesions, no abnormal discharge  Cervix:  Normal without tenderness on motion  Uterus: normal in size, contour, position, mobility, without tenderness  Adnexa: normal without masses or tenderness  Perineum: well healed perineum  Anus: no hemorroids

## 2018-01-30 ENCOUNTER — OFFICE VISIT (OUTPATIENT)
Dept: OBGYN CLINIC | Facility: CLINIC | Age: 28
End: 2018-01-30

## 2018-01-30 VITALS
HEART RATE: 72 BPM | BODY MASS INDEX: 28.34 KG/M2 | HEIGHT: 62 IN | RESPIRATION RATE: 16 BRPM | TEMPERATURE: 98 F | SYSTOLIC BLOOD PRESSURE: 100 MMHG | DIASTOLIC BLOOD PRESSURE: 60 MMHG | WEIGHT: 154 LBS

## 2018-01-30 DIAGNOSIS — Z30.430 ENCOUNTER FOR INSERTION OF MIRENA IUD: Primary | ICD-10-CM

## 2018-01-30 DIAGNOSIS — Z30.430 ENCOUNTER FOR INSERTION OF INTRAUTERINE CONTRACEPTIVE DEVICE: ICD-10-CM

## 2018-01-30 LAB
CONTROL LINE PRESENT WITH A CLEAR BACKGROUND (YES/NO): YES YES/NO
PREGNANCY TEST, URINE: NEGATIVE

## 2018-01-30 PROCEDURE — 81025 URINE PREGNANCY TEST: CPT | Performed by: OBSTETRICS & GYNECOLOGY

## 2018-01-30 PROCEDURE — 58300 INSERT INTRAUTERINE DEVICE: CPT | Performed by: OBSTETRICS & GYNECOLOGY

## 2018-01-30 NOTE — PROGRESS NOTES
IUD Insertion     Pregnancy Results: negative from urine test   Birth control method(s) used:    LMP: postpartum    Consent signed. Procedure discussed with the patient in detail including indication, risks, benefits, alternatives and complications.     Pe

## 2018-02-12 ENCOUNTER — TELEPHONE (OUTPATIENT)
Dept: OBGYN CLINIC | Facility: CLINIC | Age: 28
End: 2018-02-12

## 2018-02-12 NOTE — TELEPHONE ENCOUNTER
Pt delivered 12/18 pt need a letter to go back to work. pt went back today 12/12/18 . please fax letter to [phone].OT attn: Naomi perez

## 2018-02-27 ENCOUNTER — OFFICE VISIT (OUTPATIENT)
Dept: OBGYN CLINIC | Facility: CLINIC | Age: 28
End: 2018-02-27

## 2018-02-27 VITALS
SYSTOLIC BLOOD PRESSURE: 126 MMHG | RESPIRATION RATE: 18 BRPM | DIASTOLIC BLOOD PRESSURE: 70 MMHG | HEART RATE: 72 BPM | HEIGHT: 62 IN | BODY MASS INDEX: 28.34 KG/M2 | WEIGHT: 154 LBS

## 2018-02-27 DIAGNOSIS — Z30.431 CHECKING OF INTRAUTERINE DEVICE: Primary | ICD-10-CM

## 2018-02-27 PROCEDURE — 99212 OFFICE O/P EST SF 10 MIN: CPT | Performed by: OBSTETRICS & GYNECOLOGY

## 2018-02-27 NOTE — PROGRESS NOTES
Jeanine Luke is a 24year old female  No LMP recorded. Patient is not currently having periods (Reason: IUD - Intrauterine Device). Patient presents with:  IUD: fu mirena inserted 2018.   .  C/o spotting - reassured  OBSTETRICS HISTORY:  OB lesions  Urethral Meatus:  normal in size, location, without lesions and prolapse  Bladder:  No fullness, masses or tenderness  Vagina:  Normal appearance without lesions, no abnormal discharge  Cervix:  Normal without tenderness on motion, strings visuali

## 2018-04-25 ENCOUNTER — NURSE ONLY (OUTPATIENT)
Dept: OBGYN CLINIC | Facility: CLINIC | Age: 28
End: 2018-04-25

## 2018-04-25 DIAGNOSIS — R39.9 UTI SYMPTOMS: Primary | ICD-10-CM

## 2018-04-25 PROCEDURE — 81003 URINALYSIS AUTO W/O SCOPE: CPT | Performed by: OBSTETRICS & GYNECOLOGY

## 2018-04-25 PROCEDURE — 87086 URINE CULTURE/COLONY COUNT: CPT | Performed by: OBSTETRICS & GYNECOLOGY

## 2018-04-25 PROCEDURE — 99212 OFFICE O/P EST SF 10 MIN: CPT | Performed by: OBSTETRICS & GYNECOLOGY

## 2018-04-25 RX ORDER — NITROFURANTOIN 25; 75 MG/1; MG/1
100 CAPSULE ORAL 2 TIMES DAILY
Qty: 14 CAPSULE | Refills: 0 | Status: SHIPPED | OUTPATIENT
Start: 2018-04-25 | End: 2018-06-06

## 2018-04-25 NOTE — PROGRESS NOTES
c/o frequency and burning with urination      Lab Results  Component Value Date   SPECGRAVITY 1.020 04/25/2018   GLUUR negative 04/25/2018   PHURINE 7.0 04/25/2018   NITRITE negative 04/25/2018       A/P: UTI  - urine cultures sent  - Macrobid 100 BID x 7

## 2018-06-01 NOTE — PLAN OF CARE
Problem: GASTROINTESTINAL - ADULT  Goal: Maintains or returns to baseline bowel function  INTERVENTIONS:  - Assess bowel function  - Maintain adequate hydration with IV or PO as ordered and tolerated  - Evaluate effectiveness of GI medications  - Encourage no

## 2018-06-06 ENCOUNTER — OFFICE VISIT (OUTPATIENT)
Dept: OBGYN CLINIC | Facility: CLINIC | Age: 28
End: 2018-06-06

## 2018-06-06 VITALS
WEIGHT: 149 LBS | DIASTOLIC BLOOD PRESSURE: 72 MMHG | HEIGHT: 62 IN | HEART RATE: 80 BPM | BODY MASS INDEX: 27.42 KG/M2 | SYSTOLIC BLOOD PRESSURE: 112 MMHG

## 2018-06-06 DIAGNOSIS — Z30.40 ENCOUNTER FOR SURVEILLANCE OF CONTRACEPTIVES, UNSPECIFIED CONTRACEPTIVE: Primary | ICD-10-CM

## 2018-06-06 PROCEDURE — 99212 OFFICE O/P EST SF 10 MIN: CPT | Performed by: OBSTETRICS & GYNECOLOGY

## 2018-07-11 ENCOUNTER — TELEPHONE (OUTPATIENT)
Dept: OBGYN CLINIC | Facility: CLINIC | Age: 28
End: 2018-07-11

## 2018-07-11 NOTE — TELEPHONE ENCOUNTER
Patient informed that breast pump order was faxed to [facility], patient verbalizes understanding.

## 2018-07-11 NOTE — TELEPHONE ENCOUNTER
Per pt she has already received her breast pump, however she would like to see if she can get a second one as a back up. Please advise and call pt.  Thanks

## 2018-07-11 NOTE — TELEPHONE ENCOUNTER
Patient delivered in December 2017 and is requesting second order for a breast pump. Will check with provider and call patient back.

## 2018-08-16 ENCOUNTER — MED REC SCAN ONLY (OUTPATIENT)
Dept: OBGYN CLINIC | Facility: CLINIC | Age: 28
End: 2018-08-16

## 2018-09-19 ENCOUNTER — NURSE ONLY (OUTPATIENT)
Dept: OBGYN CLINIC | Facility: CLINIC | Age: 28
End: 2018-09-19
Payer: COMMERCIAL

## 2018-09-19 DIAGNOSIS — Z23 NEED FOR VACCINATION: ICD-10-CM

## 2018-09-19 DIAGNOSIS — N30.00 ACUTE CYSTITIS WITHOUT HEMATURIA: Primary | ICD-10-CM

## 2018-09-19 LAB
APPEARANCE: CLEAR
MULTISTIX LOT#: NORMAL NUMERIC
PH, URINE: 7 (ref 4.5–8)
SPECIFIC GRAVITY: 1.01 (ref 1–1.03)
URINE-COLOR: YELLOW
UROBILINOGEN,SEMI-QN: 0.2 MG/DL (ref 0–1.9)

## 2018-09-19 PROCEDURE — 87086 URINE CULTURE/COLONY COUNT: CPT | Performed by: OBSTETRICS & GYNECOLOGY

## 2018-09-19 PROCEDURE — 81002 URINALYSIS NONAUTO W/O SCOPE: CPT | Performed by: OBSTETRICS & GYNECOLOGY

## 2018-11-26 ENCOUNTER — MED REC SCAN ONLY (OUTPATIENT)
Dept: OBGYN CLINIC | Facility: CLINIC | Age: 28
End: 2018-11-26

## 2019-01-28 ENCOUNTER — OFFICE VISIT (OUTPATIENT)
Dept: OBGYN CLINIC | Facility: CLINIC | Age: 29
End: 2019-01-28
Payer: COMMERCIAL

## 2019-01-28 VITALS
SYSTOLIC BLOOD PRESSURE: 116 MMHG | BODY MASS INDEX: 25.95 KG/M2 | WEIGHT: 141 LBS | DIASTOLIC BLOOD PRESSURE: 56 MMHG | HEIGHT: 62 IN | HEART RATE: 73 BPM

## 2019-01-28 DIAGNOSIS — R10.2 PELVIC PAIN IN FEMALE: ICD-10-CM

## 2019-01-28 DIAGNOSIS — Z12.4 PAPANICOLAOU SMEAR FOR CERVICAL CANCER SCREENING: ICD-10-CM

## 2019-01-28 DIAGNOSIS — Z01.419 WELL WOMAN EXAM WITH ROUTINE GYNECOLOGICAL EXAM: Primary | ICD-10-CM

## 2019-01-28 PROCEDURE — 88175 CYTOPATH C/V AUTO FLUID REDO: CPT | Performed by: OBSTETRICS & GYNECOLOGY

## 2019-01-28 PROCEDURE — 99395 PREV VISIT EST AGE 18-39: CPT | Performed by: OBSTETRICS & GYNECOLOGY

## 2019-01-28 PROCEDURE — 87624 HPV HI-RISK TYP POOLED RSLT: CPT | Performed by: OBSTETRICS & GYNECOLOGY

## 2019-01-28 NOTE — PROGRESS NOTES
No complaints        MEDICAL HISTORY:  Past Medical History:   Diagnosis Date   • ADHD (attention deficit hyperactivity disorder)    • Postpartum depression, current hospitalization     no meds at this time       SURGICAL HISTORY:  Past Surgical History: Grandmother    • No Known Problems Paternal Grandfather      No breast cancer  MEDICATIONS:    Current Outpatient Medications:   •  Prenatal Vit-Fe Fumarate-FA (PRENATAL FA OR), Take by mouth., Disp: , Rfl:     ALLERGIES:    Amoxicillin             RASH  L

## 2019-01-29 LAB — HPV I/H RISK 1 DNA SPEC QL NAA+PROBE: NEGATIVE

## 2019-01-30 LAB — LAST PAP RESULT: NORMAL

## 2019-02-01 ENCOUNTER — HOSPITAL ENCOUNTER (OUTPATIENT)
Dept: ULTRASOUND IMAGING | Age: 29
Discharge: HOME | End: 2019-02-01
Attending: OBSTETRICS & GYNECOLOGY
Payer: COMMERCIAL

## 2019-02-01 DIAGNOSIS — R10.2 PELVIC PAIN IN FEMALE: ICD-10-CM

## 2019-02-01 PROCEDURE — 76856 US EXAM PELVIC COMPLETE: CPT | Performed by: OBSTETRICS & GYNECOLOGY

## 2019-02-01 PROCEDURE — 76830 TRANSVAGINAL US NON-OB: CPT | Performed by: OBSTETRICS & GYNECOLOGY

## 2019-06-10 ENCOUNTER — OFFICE VISIT (OUTPATIENT)
Dept: OBGYN CLINIC | Facility: CLINIC | Age: 29
End: 2019-06-10
Payer: COMMERCIAL

## 2019-06-10 VITALS
HEART RATE: 68 BPM | BODY MASS INDEX: 26.57 KG/M2 | HEIGHT: 62 IN | WEIGHT: 144.38 LBS | DIASTOLIC BLOOD PRESSURE: 60 MMHG | SYSTOLIC BLOOD PRESSURE: 108 MMHG

## 2019-06-10 DIAGNOSIS — N89.8 VAGINAL ODOR: Primary | ICD-10-CM

## 2019-06-10 PROBLEM — Z34.00 SUPERVISION OF NORMAL FIRST PREGNANCY: Status: RESOLVED | Noted: 2017-05-08 | Resolved: 2019-06-10

## 2019-06-10 PROBLEM — Z34.00 SUPERVISION OF NORMAL FIRST PREGNANCY (HCC): Status: RESOLVED | Noted: 2017-05-08 | Resolved: 2019-06-10

## 2019-06-10 PROBLEM — Z34.90 PREGNANCY (HCC): Status: RESOLVED | Noted: 2017-12-16 | Resolved: 2019-06-10

## 2019-06-10 PROBLEM — Z34.90 PREGNANCY: Status: RESOLVED | Noted: 2017-12-16 | Resolved: 2019-06-10

## 2019-06-10 PROCEDURE — 87480 CANDIDA DNA DIR PROBE: CPT | Performed by: OBSTETRICS & GYNECOLOGY

## 2019-06-10 PROCEDURE — 87660 TRICHOMONAS VAGIN DIR PROBE: CPT | Performed by: OBSTETRICS & GYNECOLOGY

## 2019-06-10 PROCEDURE — 87510 GARDNER VAG DNA DIR PROBE: CPT | Performed by: OBSTETRICS & GYNECOLOGY

## 2019-06-10 PROCEDURE — 99213 OFFICE O/P EST LOW 20 MIN: CPT | Performed by: OBSTETRICS & GYNECOLOGY

## 2019-06-10 NOTE — PROGRESS NOTES
Jordan Cowart is a 34year old female  No LMP recorded. (Menstrual status: IUD - Intrauterine Device). Patient presents with:  Gyn Problem: VAGINAL ODOR  FOR 1 MONTH NO DISCHARGE  . Patient c/o vaginal odor on and off for several months, no vaginal Relationships      Social connections:        Talks on phone: Not on file        Gets together: Not on file        Attends [employer] service: Not on file        Active member of club or organization: Not on file        Attends meetings of clubs or [place] normal  Anus: no hemorroids     Assessment & Plan:  1.  Vaginal odor  - if test negative, advised to try Rephresh  - VAGINITIS/VAGINOSIS, DNA PROBE; Future

## 2019-06-12 RX ORDER — METRONIDAZOLE 7.5 MG/G
1 GEL VAGINAL NIGHTLY
Qty: 1 TUBE | Refills: 0 | Status: SHIPPED | OUTPATIENT
Start: 2019-06-12 | End: 2020-07-22 | Stop reason: ALTCHOICE

## 2019-10-26 ENCOUNTER — IMMUNIZATION (OUTPATIENT)
Dept: FAMILY MEDICINE CLINIC | Facility: CLINIC | Age: 29
End: 2019-10-26
Payer: COMMERCIAL

## 2019-10-26 DIAGNOSIS — Z23 NEED FOR VACCINATION: ICD-10-CM

## 2019-10-26 PROCEDURE — 90471 IMMUNIZATION ADMIN: CPT | Performed by: NURSE PRACTITIONER

## 2019-10-26 PROCEDURE — 90686 IIV4 VACC NO PRSV 0.5 ML IM: CPT | Performed by: NURSE PRACTITIONER

## 2020-07-22 NOTE — PATIENT INSTRUCTIONS
Get your labs done. You should be fasting for at least 10 hours. If you take a multivitamin with Biotin or any biotin product it should be held for 3 days prior to getting your labs done. Call  to make an apt.     The nurse navigator will call y

## 2020-07-22 NOTE — PROGRESS NOTES
[address] [employer]    CHIEF COMPLAINT:  Patient presents with:   Anxiety: Used to See Psych prior to 2016 before she got pregnant with son, hasn't returned but wants to  Referral:  Navigator        HISTORY OF PRESENT ILLNESS:  The patient is a 30 year o Allergies:     Allergies As of Date: 07/22/2020  Allergen                          Noted       Reaction  AMOXICILLIN                       05/08/2017  RASH  LAMICTAL [LAMOTRIGINE]            05/08/2017  RASH  RISPERDAL [RISPERIDONE]           0 Occupational Exposure: Not Asked        Hobby Hazards: Not Asked        Sleep Concern: Not Asked        Stress Concern: Not Asked        Weight Concern: Not Asked        Special Diet: Not Asked        Back Care: Not Asked        Exercise: Yes          3-4 adenopathy  LUNGS: clear to auscultation; no rhonchi, rales, or wheezing  CARDIO: RRR without murmur  GI: good BS's,no masses, organomegaly or tenderness  MUSCULOSKELETAL: No obvious joint deformity or swelling. Normal gait.   EXTREMITIES: no cyanosis, clu

## 2020-07-27 ENCOUNTER — TELEPHONE (OUTPATIENT)
Dept: INTERNAL MEDICINE CLINIC | Facility: CLINIC | Age: 30
End: 2020-07-27

## 2020-07-27 NOTE — TELEPHONE ENCOUNTER
Scooter Bernal,     Patient has a psychiatry appointment with [facility] at [place] [address] location on 8/13/20 at 1500.       Please let me know if you need anything else.      Thank you,   Yosef Mahoney

## 2020-07-28 ENCOUNTER — LAB ENCOUNTER (OUTPATIENT)
Dept: LAB | Age: 30
End: 2020-07-28
Attending: NURSE PRACTITIONER
Payer: COMMERCIAL

## 2020-07-28 DIAGNOSIS — Z13.220 SCREENING FOR CHOLESTEROL LEVEL: ICD-10-CM

## 2020-07-28 DIAGNOSIS — Z00.00 PHYSICAL EXAM: ICD-10-CM

## 2020-07-28 DIAGNOSIS — Z13.29 SCREENING FOR THYROID DISORDER: ICD-10-CM

## 2020-07-28 LAB
ALBUMIN SERPL-MCNC: 4.3 G/DL (ref 3.4–5)
ALBUMIN/GLOB SERPL: 1.2 {RATIO} (ref 1–2)
ALP LIVER SERPL-CCNC: 55 U/L (ref 37–98)
ALT SERPL-CCNC: 29 U/L (ref 13–56)
ANION GAP SERPL CALC-SCNC: 5 MMOL/L (ref 0–18)
AST SERPL-CCNC: 15 U/L (ref 15–37)
BASOPHILS # BLD AUTO: 0.05 X10(3) UL (ref 0–0.2)
BASOPHILS NFR BLD AUTO: 0.4 %
BILIRUB SERPL-MCNC: 0.5 MG/DL (ref 0.1–2)
BUN BLD-MCNC: 7 MG/DL (ref 7–18)
BUN/CREAT SERPL: 9.2 (ref 10–20)
CALCIUM BLD-MCNC: 8.9 MG/DL (ref 8.5–10.1)
CHLORIDE SERPL-SCNC: 107 MMOL/L (ref 98–112)
CHOLEST SMN-MCNC: 181 MG/DL (ref ?–200)
CO2 SERPL-SCNC: 26 MMOL/L (ref 21–32)
CREAT BLD-MCNC: 0.76 MG/DL (ref 0.55–1.02)
DEPRECATED RDW RBC AUTO: 39.2 FL (ref 35.1–46.3)
EOSINOPHIL # BLD AUTO: 0.16 X10(3) UL (ref 0–0.7)
EOSINOPHIL NFR BLD AUTO: 1.2 %
ERYTHROCYTE [DISTWIDTH] IN BLOOD BY AUTOMATED COUNT: 12.2 % (ref 11–15)
GLOBULIN PLAS-MCNC: 3.7 G/DL (ref 2.8–4.4)
GLUCOSE BLD-MCNC: 82 MG/DL (ref 70–99)
HCT VFR BLD AUTO: 47.8 % (ref 35–48)
HDLC SERPL-MCNC: 56 MG/DL (ref 40–59)
HGB BLD-MCNC: 15.5 G/DL (ref 12–16)
IMM GRANULOCYTES # BLD AUTO: 0.03 X10(3) UL (ref 0–1)
IMM GRANULOCYTES NFR BLD: 0.2 %
LDLC SERPL CALC-MCNC: 108 MG/DL (ref ?–100)
LYMPHOCYTES # BLD AUTO: 4.02 X10(3) UL (ref 1–4)
LYMPHOCYTES NFR BLD AUTO: 29 %
M PROTEIN MFR SERPL ELPH: 8 G/DL (ref 6.4–8.2)
MCH RBC QN AUTO: 28.8 PG (ref 26–34)
MCHC RBC AUTO-ENTMCNC: 32.4 G/DL (ref 31–37)
MCV RBC AUTO: 88.7 FL (ref 80–100)
MONOCYTES # BLD AUTO: 0.74 X10(3) UL (ref 0.1–1)
MONOCYTES NFR BLD AUTO: 5.3 %
NEUTROPHILS # BLD AUTO: 8.86 X10 (3) UL (ref 1.5–7.7)
NEUTROPHILS # BLD AUTO: 8.86 X10(3) UL (ref 1.5–7.7)
NEUTROPHILS NFR BLD AUTO: 63.9 %
NONHDLC SERPL-MCNC: 125 MG/DL (ref ?–130)
OSMOLALITY SERPL CALC.SUM OF ELEC: 283 MOSM/KG (ref 275–295)
PATIENT FASTING Y/N/NP: YES
PATIENT FASTING Y/N/NP: YES
PLATELET # BLD AUTO: 411 10(3)UL (ref 150–450)
POTASSIUM SERPL-SCNC: 3.6 MMOL/L (ref 3.5–5.1)
RBC # BLD AUTO: 5.39 X10(6)UL (ref 3.8–5.3)
SODIUM SERPL-SCNC: 138 MMOL/L (ref 136–145)
TRIGL SERPL-MCNC: 84 MG/DL (ref 30–149)
TSI SER-ACNC: 1.38 MIU/ML (ref 0.36–3.74)
VLDLC SERPL CALC-MCNC: 17 MG/DL (ref 0–30)
WBC # BLD AUTO: 13.9 X10(3) UL (ref 4–11)

## 2020-07-28 PROCEDURE — 84443 ASSAY THYROID STIM HORMONE: CPT

## 2020-07-28 PROCEDURE — 85025 COMPLETE CBC W/AUTO DIFF WBC: CPT

## 2020-07-28 PROCEDURE — 80053 COMPREHEN METABOLIC PANEL: CPT

## 2020-07-28 PROCEDURE — 80061 LIPID PANEL: CPT

## 2020-07-29 DIAGNOSIS — D72.828 OTHER ELEVATED WHITE BLOOD CELL (WBC) COUNT: Primary | ICD-10-CM

## 2020-07-29 PROBLEM — F90.9 ADHD: Status: ACTIVE | Noted: 2020-07-29

## 2020-07-29 NOTE — PROGRESS NOTES
Left detailed message per HIPAA. Made aware of results & recommendations. Advised to call back to confirm if has any infection s/s & let us know if already sees hematologist.  Repeat CBC in 6 months NOT ordered yet. MyChart result comment sent as well.

## 2020-09-17 ENCOUNTER — OFFICE VISIT (OUTPATIENT)
Dept: INTERNAL MEDICINE CLINIC | Facility: CLINIC | Age: 30
End: 2020-09-17
Payer: COMMERCIAL

## 2020-09-17 VITALS
SYSTOLIC BLOOD PRESSURE: 122 MMHG | WEIGHT: 166.81 LBS | TEMPERATURE: 98 F | DIASTOLIC BLOOD PRESSURE: 76 MMHG | HEART RATE: 86 BPM | OXYGEN SATURATION: 96 % | HEIGHT: 62.5 IN | RESPIRATION RATE: 14 BRPM | BODY MASS INDEX: 29.93 KG/M2

## 2020-09-17 DIAGNOSIS — D72.9 NEUTROPHILIC LEUKOCYTOSIS: ICD-10-CM

## 2020-09-17 DIAGNOSIS — Z00.00 ROUTINE GENERAL MEDICAL EXAMINATION AT A HEALTH CARE FACILITY: Primary | ICD-10-CM

## 2020-09-17 DIAGNOSIS — Z23 NEED FOR VACCINATION: ICD-10-CM

## 2020-09-17 PROBLEM — Z30.40 CONTRACEPTIVE SURVEILLANCE: Status: RESOLVED | Noted: 2018-06-06 | Resolved: 2020-09-17

## 2020-09-17 PROBLEM — Z01.419 WELL WOMAN EXAM WITH ROUTINE GYNECOLOGICAL EXAM: Status: RESOLVED | Noted: 2019-01-28 | Resolved: 2020-09-17

## 2020-09-17 PROBLEM — D72.828 NEUTROPHILIC LEUKOCYTOSIS: Status: ACTIVE | Noted: 2020-09-17

## 2020-09-17 PROCEDURE — 3074F SYST BP LT 130 MM HG: CPT | Performed by: INTERNAL MEDICINE

## 2020-09-17 PROCEDURE — 3078F DIAST BP <80 MM HG: CPT | Performed by: INTERNAL MEDICINE

## 2020-09-17 PROCEDURE — 3008F BODY MASS INDEX DOCD: CPT | Performed by: INTERNAL MEDICINE

## 2020-09-17 PROCEDURE — 90686 IIV4 VACC NO PRSV 0.5 ML IM: CPT | Performed by: INTERNAL MEDICINE

## 2020-09-17 PROCEDURE — 99395 PREV VISIT EST AGE 18-39: CPT | Performed by: INTERNAL MEDICINE

## 2020-09-17 PROCEDURE — 90471 IMMUNIZATION ADMIN: CPT | Performed by: INTERNAL MEDICINE

## 2020-09-17 NOTE — PROGRESS NOTES
HPI:   Bro Blanc is a 31year old female who presents for a complete physical exam. She sees gyne and has mirena.      Wt Readings from Last 6 Encounters:  09/17/20 : 166 lb 12.8 oz (75.7 kg)  07/22/20 : 165 lb (74.8 kg)  06/10/19 : 144 lb 6.4 oz (65. 0.358 - 3.740 mIU/mL Final   • WBC 07/28/2020 13.9* 4.0 - 11.0 x10(3) uL Final   • RBC 07/28/2020 5.39* 3.80 - 5.30 x10(6)uL Final   • HGB 07/28/2020 15.5  12.0 - 16.0 g/dL Final   • HCT 07/28/2020 47.8  35.0 - 48.0 % Final   • PLT 07/28/2020 411.0  150.0 Problems Maternal Grandmother    • No Known Problems Maternal Grandfather    • No Known Problems Paternal Grandmother    • No Known Problems Paternal Grandfather    • Obesity Brother         VICKY   • Cancer Paternal Aunt         pancreatic     Patient Activ dysphagia, no change in skin, voice, hair, nails , bms or weight, no palpitations, tremors or sweats  No PU/PD  ALL/ASTHMA:no allergic nasal or chest symptoms  EXAM:   /76 (BP Location: Left arm, Patient Position: Sitting, Cuff Size: adult)   Pulse 8 FASTING Yes    LIPID PANEL   Result Value Ref Range    Cholesterol, Total 181 <200 mg/dL    HDL Cholesterol 56 40 - 59 mg/dL    Triglycerides 84 30 - 149 mg/dL    LDL Cholesterol 108 (H) <100 mg/dL    VLDL 17 0 - 30 mg/dL    Non HDL Chol 125 <130 mg/dL [place]    No follow-ups on file.

## 2020-10-01 ENCOUNTER — TELEPHONE (OUTPATIENT)
Dept: INTERNAL MEDICINE CLINIC | Facility: CLINIC | Age: 30
End: 2020-10-01

## 2020-10-01 NOTE — TELEPHONE ENCOUNTER
Patient had her physical on September 17th with Dr Santino Urias and is wondering if her biometrics form was completed and sent to her work. Please advise. Thank you!

## 2020-10-02 NOTE — TELEPHONE ENCOUNTER
Received [facility] Care Provider Form\" from pt. Attached to [employer] message. Noted form already signed by Dr. Helder Jimenez. All alejo completed. Faxed to [facility] [phone]  Confirmed fax sent.   To scanning    Informed pt that fax was se

## 2020-10-06 ENCOUNTER — MED REC SCAN ONLY (OUTPATIENT)
Dept: INTERNAL MEDICINE CLINIC | Facility: CLINIC | Age: 30
End: 2020-10-06

## 2021-04-01 ENCOUNTER — IMMUNIZATION (OUTPATIENT)
Dept: LAB | Age: 31
End: 2021-04-01
Attending: HOSPITALIST
Payer: COMMERCIAL

## 2021-04-01 DIAGNOSIS — Z23 NEED FOR VACCINATION: Primary | ICD-10-CM

## 2021-04-01 PROCEDURE — 0001A SARSCOV2 VAC 30MCG/0.3ML IM: CPT

## 2021-04-22 ENCOUNTER — IMMUNIZATION (OUTPATIENT)
Dept: LAB | Age: 31
End: 2021-04-22
Attending: HOSPITALIST
Payer: COMMERCIAL

## 2021-04-22 DIAGNOSIS — Z23 NEED FOR VACCINATION: Primary | ICD-10-CM

## 2021-04-22 PROCEDURE — 0002A SARSCOV2 VAC 30MCG/0.3ML IM: CPT

## 2021-05-09 LAB
AMB EXT CHOL/HDL RATIO: 4.1
AMB EXT CHOLESTEROL, TOTAL: 189 MG/DL
AMB EXT HDL CHOLESTEROL: 46 MG/DL
AMB EXT HGBA1C: 5.2 %
AMB EXT LDL CHOLESTEROL, DIRECT: 110 MG/DL
AMB EXT NON HDL CHOL: 143 MG/DL
AMB EXT TRIGLYCERIDES: 167 MG/DL
AMB EXT VLDL: 33 MG/DL

## 2021-05-20 ENCOUNTER — TELEPHONE (OUTPATIENT)
Dept: INTERNAL MEDICINE CLINIC | Facility: CLINIC | Age: 31
End: 2021-05-20

## 2021-05-20 NOTE — TELEPHONE ENCOUNTER
Incoming (mail or fax):   Fax  Received from:  [facility]   Documentation given to:  Dr. Kayla Mckinnon results bin    Test Results (

## 2021-05-21 NOTE — TELEPHONE ENCOUNTER
Karly, Telepsych, consulted about patient's responses to suicide risk assessment.  Clarified with patient that he has fleeting thoughts of harming self without plan for suicide.  Karly suggests to give patient safety plan with community resources, if needed, upon discharge.  MD taylor.    PSR- Please see note below  Please call pt to schedule OV, thanks

## 2021-05-21 NOTE — TELEPHONE ENCOUNTER
Received wellness screening results. Updated results as able in Ext Result Console  To Dr. Tano Avery for review.

## 2021-05-25 ENCOUNTER — MED REC SCAN ONLY (OUTPATIENT)
Dept: INTERNAL MEDICINE CLINIC | Facility: CLINIC | Age: 31
End: 2021-05-25

## 2021-06-03 ENCOUNTER — TELEPHONE (OUTPATIENT)
Dept: INTERNAL MEDICINE CLINIC | Facility: CLINIC | Age: 31
End: 2021-06-03

## 2021-06-03 NOTE — TELEPHONE ENCOUNTER
Called and spk w/pt to reminder her of her apt tomorrow and open lab for CBC. Advised pt [facility] labs are now accepting walk-ins and to please try and complete this lab before apt tomorrow. Pt stated she understood.

## 2021-06-04 ENCOUNTER — LAB ENCOUNTER (OUTPATIENT)
Dept: LAB | Age: 31
End: 2021-06-04
Attending: NURSE PRACTITIONER
Payer: COMMERCIAL

## 2021-06-04 ENCOUNTER — OFFICE VISIT (OUTPATIENT)
Dept: INTERNAL MEDICINE CLINIC | Facility: CLINIC | Age: 31
End: 2021-06-04
Payer: COMMERCIAL

## 2021-06-04 VITALS
BODY MASS INDEX: 32.19 KG/M2 | HEIGHT: 62.5 IN | WEIGHT: 179.38 LBS | HEART RATE: 93 BPM | TEMPERATURE: 98 F | SYSTOLIC BLOOD PRESSURE: 112 MMHG | OXYGEN SATURATION: 98 % | RESPIRATION RATE: 14 BRPM | DIASTOLIC BLOOD PRESSURE: 88 MMHG

## 2021-06-04 DIAGNOSIS — D72.828 OTHER ELEVATED WHITE BLOOD CELL (WBC) COUNT: ICD-10-CM

## 2021-06-04 DIAGNOSIS — D72.9 NEUTROPHILIC LEUKOCYTOSIS: ICD-10-CM

## 2021-06-04 DIAGNOSIS — R03.0 ELEVATED BP WITHOUT DIAGNOSIS OF HYPERTENSION: Primary | ICD-10-CM

## 2021-06-04 PROCEDURE — 3079F DIAST BP 80-89 MM HG: CPT | Performed by: INTERNAL MEDICINE

## 2021-06-04 PROCEDURE — 3074F SYST BP LT 130 MM HG: CPT | Performed by: INTERNAL MEDICINE

## 2021-06-04 PROCEDURE — 3008F BODY MASS INDEX DOCD: CPT | Performed by: INTERNAL MEDICINE

## 2021-06-04 PROCEDURE — 99213 OFFICE O/P EST LOW 20 MIN: CPT | Performed by: INTERNAL MEDICINE

## 2021-06-04 PROCEDURE — 85025 COMPLETE CBC W/AUTO DIFF WBC: CPT | Performed by: NURSE PRACTITIONER

## 2021-06-04 RX ORDER — CALCIUM CITRATE/VITAMIN D3 200MG-6.25
2 TABLET ORAL DAILY
COMMUNITY

## 2021-06-04 NOTE — PROGRESS NOTES
Micki Vu is a 36year old female  Patient presents with:  Blood Pressure: 141/95 - BP Reading at [facility] plus Labs      HPI:   She took her own BP for a work biometric  141/95  She has gained 15 lb since COVID and 55lb since pre-pg mg/dL    Direct  mg/dL    Triglycerides 167 mg/dL    VLDL 33 mg/dL    Chol/HDL Ratio 4.10     Non HDL Chol 143 mg/dL           ASSESSMENT AND PLAN:   Elevated bp without diagnosis of hypertension  (primary encounter diagnosis)-obesity, sedentary, hi

## 2021-10-08 ENCOUNTER — OFFICE VISIT (OUTPATIENT)
Dept: INTERNAL MEDICINE CLINIC | Facility: CLINIC | Age: 31
End: 2021-10-08
Payer: COMMERCIAL

## 2021-10-08 VITALS
SYSTOLIC BLOOD PRESSURE: 124 MMHG | OXYGEN SATURATION: 98 % | RESPIRATION RATE: 16 BRPM | HEART RATE: 78 BPM | BODY MASS INDEX: 32.44 KG/M2 | TEMPERATURE: 97 F | WEIGHT: 180.81 LBS | DIASTOLIC BLOOD PRESSURE: 76 MMHG | HEIGHT: 62.5 IN

## 2021-10-08 DIAGNOSIS — Z23 NEED FOR VACCINATION: ICD-10-CM

## 2021-10-08 DIAGNOSIS — D72.9 NEUTROPHILIC LEUKOCYTOSIS: ICD-10-CM

## 2021-10-08 DIAGNOSIS — Z00.00 ROUTINE GENERAL MEDICAL EXAMINATION AT A HEALTH CARE FACILITY: Primary | ICD-10-CM

## 2021-10-08 PROCEDURE — 99395 PREV VISIT EST AGE 18-39: CPT | Performed by: INTERNAL MEDICINE

## 2021-10-08 PROCEDURE — 3078F DIAST BP <80 MM HG: CPT | Performed by: INTERNAL MEDICINE

## 2021-10-08 PROCEDURE — 3008F BODY MASS INDEX DOCD: CPT | Performed by: INTERNAL MEDICINE

## 2021-10-08 PROCEDURE — 3074F SYST BP LT 130 MM HG: CPT | Performed by: INTERNAL MEDICINE

## 2021-10-08 PROCEDURE — 90686 IIV4 VACC NO PRSV 0.5 ML IM: CPT | Performed by: INTERNAL MEDICINE

## 2021-10-08 PROCEDURE — 90471 IMMUNIZATION ADMIN: CPT | Performed by: INTERNAL MEDICINE

## 2021-10-08 NOTE — PROGRESS NOTES
Body mass index is 32.54 kg/m². HPI:   May Bakrer is a 36year old female who presents for a complete physical exam. She sees gyne and has mirena.      Wt Readings from Last 6 Encounters:  10/08/21 : 180 lb 12.8 oz (82 kg)  06/04/21 : 179 lb 6.4 oz GENERAL denies night sweats, weight loss, rash, joint pain,   SKIN: denies any unusual skin lesions, no bothersome rashes  EYES:denies visual disturbances, or blurred vision, eye discharge, redness or irritation  HEENT: denies nasal congestion, sinus p bruits  BREAST: no dominant or suspicious mass, no nipple d/c, inversion or drainage, no axillary LA  LUNGS: clear to auscultation and percussion  CARDIO: RRR without murmur or gallop  MSK spine exam deferred  EXTREMITIES: no cyanosis, clubbing or edema, n condition, lifelong. She has heretofore declined hematologic specialty consultation        There are no Patient Instructions on file for this visit.     Meds & Refills for this Visit:  Requested Prescriptions      No prescriptions requested or ordered in th rash, joint pain,   SKIN: denies any unusual skin lesions, no bothersome rashes  EYES:denies visual disturbances, or blurred vision, eye discharge, redness or irritation  HEENT: denies nasal congestion, sinus pain , recurrent STs or swollen glands, hearing percussion  CARDIO: RRR without murmur or gallop  GI: good BS's,no masses, HSM or tenderness  MUSCULOSKELETAL: back is not tender,FROM of the back  EXTREMITIES: no cyanosis, clubbing or edema, no varicosities or stasis change  NEURO: Oriented times three,c this encounter       Imaging & Consults:  FLULAVAL INFLUENZA VACCINE QUAD PRESERVATIVE FREE 0.5 ML    Return for back pain.

## 2022-08-13 ENCOUNTER — HOSPITAL ENCOUNTER (OUTPATIENT)
Age: 32
Discharge: HOME | End: 2022-08-13
Payer: COMMERCIAL

## 2022-08-13 VITALS
OXYGEN SATURATION: 98 % | WEIGHT: 180 LBS | SYSTOLIC BLOOD PRESSURE: 143 MMHG | TEMPERATURE: 97 F | DIASTOLIC BLOOD PRESSURE: 91 MMHG | RESPIRATION RATE: 20 BRPM | HEIGHT: 63 IN | HEART RATE: 90 BPM | BODY MASS INDEX: 31.89 KG/M2

## 2022-08-13 DIAGNOSIS — H66.001 ACUTE SUPPURATIVE OTITIS MEDIA OF RIGHT EAR WITHOUT SPONTANEOUS RUPTURE OF TYMPANIC MEMBRANE, RECURRENCE NOT SPECIFIED: ICD-10-CM

## 2022-08-13 DIAGNOSIS — H10.33 ACUTE BACTERIAL CONJUNCTIVITIS OF BOTH EYES: Primary | ICD-10-CM

## 2022-08-13 LAB
S PYO AG THROAT QL: NEGATIVE
SARS-COV-2 RNA RESP QL NAA+PROBE: NOT DETECTED

## 2022-08-13 PROCEDURE — 87880 STREP A ASSAY W/OPTIC: CPT | Performed by: NURSE PRACTITIONER

## 2022-08-13 PROCEDURE — 99213 OFFICE O/P EST LOW 20 MIN: CPT | Performed by: NURSE PRACTITIONER

## 2022-08-13 PROCEDURE — U0002 COVID-19 LAB TEST NON-CDC: HCPCS | Performed by: NURSE PRACTITIONER

## 2022-08-13 RX ORDER — AZITHROMYCIN 250 MG/1
TABLET, FILM COATED ORAL
Qty: 6 TABLET | Refills: 0 | Status: SHIPPED | OUTPATIENT
Start: 2022-08-13 | End: 2022-08-18

## 2022-08-13 RX ORDER — ERYTHROMYCIN 5 MG/G
1 OINTMENT OPHTHALMIC EVERY 6 HOURS
Qty: 1 G | Refills: 0 | Status: SHIPPED | OUTPATIENT
Start: 2022-08-13 | End: 2022-08-20

## 2022-08-13 NOTE — ED INITIAL ASSESSMENT (HPI)
Patient presents a&o 4/4 with c/o eye irritation, sore throat, congestion, R ear pain since Wednesday

## 2022-08-26 ENCOUNTER — HOSPITAL ENCOUNTER (OUTPATIENT)
Age: 32
Discharge: HOME | End: 2022-08-26
Payer: COMMERCIAL

## 2022-08-26 DIAGNOSIS — Z20.822 ENCOUNTER FOR LABORATORY TESTING FOR COVID-19 VIRUS: Primary | ICD-10-CM

## 2022-08-26 LAB — SARS-COV-2 RNA RESP QL NAA+PROBE: NOT DETECTED

## 2022-08-26 NOTE — ED NOTES
Patient presents a&o 4/4 requesting covid-19 testing. Denies symptoms.  States has been around a covid+ person on Sunday and Wednesday

## 2022-11-18 ENCOUNTER — OFFICE VISIT (OUTPATIENT)
Facility: CLINIC | Age: 32
End: 2022-11-18
Payer: COMMERCIAL

## 2022-11-18 VITALS
BODY MASS INDEX: 32.98 KG/M2 | SYSTOLIC BLOOD PRESSURE: 126 MMHG | HEIGHT: 62.5 IN | HEART RATE: 90 BPM | DIASTOLIC BLOOD PRESSURE: 78 MMHG | WEIGHT: 183.81 LBS

## 2022-11-18 DIAGNOSIS — Z12.4 CERVICAL CANCER SCREENING: ICD-10-CM

## 2022-11-18 DIAGNOSIS — Z01.419 ENCOUNTER FOR WELL WOMAN EXAM WITH ROUTINE GYNECOLOGICAL EXAM: Primary | ICD-10-CM

## 2022-11-18 DIAGNOSIS — Z23 NEED FOR VACCINATION: ICD-10-CM

## 2022-11-18 PROCEDURE — 90471 IMMUNIZATION ADMIN: CPT | Performed by: OBSTETRICS & GYNECOLOGY

## 2022-11-18 PROCEDURE — 99395 PREV VISIT EST AGE 18-39: CPT | Performed by: OBSTETRICS & GYNECOLOGY

## 2022-11-18 PROCEDURE — 3008F BODY MASS INDEX DOCD: CPT | Performed by: OBSTETRICS & GYNECOLOGY

## 2022-11-18 PROCEDURE — 90686 IIV4 VACC NO PRSV 0.5 ML IM: CPT | Performed by: OBSTETRICS & GYNECOLOGY

## 2022-11-18 PROCEDURE — 3074F SYST BP LT 130 MM HG: CPT | Performed by: OBSTETRICS & GYNECOLOGY

## 2022-11-18 PROCEDURE — 87624 HPV HI-RISK TYP POOLED RSLT: CPT | Performed by: OBSTETRICS & GYNECOLOGY

## 2022-11-18 PROCEDURE — 3078F DIAST BP <80 MM HG: CPT | Performed by: OBSTETRICS & GYNECOLOGY

## 2022-11-21 LAB — HPV I/H RISK 1 DNA SPEC QL NAA+PROBE: NEGATIVE

## 2022-11-26 ENCOUNTER — HOSPITAL ENCOUNTER (OUTPATIENT)
Age: 32
Discharge: HOME | End: 2022-11-26
Payer: COMMERCIAL

## 2022-11-26 ENCOUNTER — APPOINTMENT (OUTPATIENT)
Dept: GENERAL RADIOLOGY | Age: 32
End: 2022-11-26
Payer: COMMERCIAL

## 2022-11-26 VITALS
DIASTOLIC BLOOD PRESSURE: 93 MMHG | HEART RATE: 100 BPM | TEMPERATURE: 98 F | SYSTOLIC BLOOD PRESSURE: 138 MMHG | BODY MASS INDEX: 31.89 KG/M2 | HEIGHT: 63 IN | RESPIRATION RATE: 20 BRPM | OXYGEN SATURATION: 97 % | WEIGHT: 180 LBS

## 2022-11-26 DIAGNOSIS — Z18.81 GLASS FOREIGN BODY IN SKIN: Primary | ICD-10-CM

## 2022-11-26 DIAGNOSIS — S91.311A LACERATION OF RIGHT FOOT, INITIAL ENCOUNTER: ICD-10-CM

## 2022-11-26 DIAGNOSIS — T14.8XXA GLASS FOREIGN BODY IN SKIN: Primary | ICD-10-CM

## 2022-11-26 PROCEDURE — 99213 OFFICE O/P EST LOW 20 MIN: CPT

## 2022-11-26 PROCEDURE — 73630 X-RAY EXAM OF FOOT: CPT

## 2022-11-26 RX ORDER — CLINDAMYCIN HYDROCHLORIDE 300 MG/1
300 CAPSULE ORAL 3 TIMES DAILY
Qty: 30 CAPSULE | Refills: 0 | Status: SHIPPED | OUTPATIENT
Start: 2022-11-26 | End: 2022-12-06

## 2022-11-26 NOTE — DISCHARGE INSTRUCTIONS
Wash the wound 2-3 times a day at least with soap and water and place a small amount of bacitracin twice a day. Keep the wound covered while out and about, can leave the wound open to air while at home for short time every day. Watch for signs of infection including redness, swelling, drainage, fever and if any of those things occur or you have any other concerning complaints you should go to the emergency department. Follow-up in  3-4 days for wound check. Otherwise follow-up with her primary care doctor.

## 2022-11-26 NOTE — ED INITIAL ASSESSMENT (HPI)
Pt presents with right inner foot laceration. 2cm in length. Pt stepped on a piece of glass at 1030p last night. Pt unsure if any glass is in foot.

## 2023-01-04 ENCOUNTER — HOSPITAL ENCOUNTER (OUTPATIENT)
Age: 33
Discharge: HOME | End: 2023-01-04
Payer: COMMERCIAL

## 2023-01-04 VITALS
TEMPERATURE: 98 F | DIASTOLIC BLOOD PRESSURE: 88 MMHG | HEART RATE: 110 BPM | SYSTOLIC BLOOD PRESSURE: 131 MMHG | HEIGHT: 63 IN | WEIGHT: 180 LBS | RESPIRATION RATE: 16 BRPM | BODY MASS INDEX: 31.89 KG/M2 | OXYGEN SATURATION: 100 %

## 2023-01-04 DIAGNOSIS — R52 BODY ACHES: ICD-10-CM

## 2023-01-04 DIAGNOSIS — J01.40 ACUTE NON-RECURRENT PANSINUSITIS: Primary | ICD-10-CM

## 2023-01-04 LAB
POCT INFLUENZA A: NEGATIVE
POCT INFLUENZA B: NEGATIVE
SARS-COV-2 RNA RESP QL NAA+PROBE: NOT DETECTED

## 2023-01-04 PROCEDURE — 87502 INFLUENZA DNA AMP PROBE: CPT | Performed by: NURSE PRACTITIONER

## 2023-01-04 PROCEDURE — 99213 OFFICE O/P EST LOW 20 MIN: CPT | Performed by: NURSE PRACTITIONER

## 2023-01-04 PROCEDURE — U0002 COVID-19 LAB TEST NON-CDC: HCPCS | Performed by: NURSE PRACTITIONER

## 2023-01-04 RX ORDER — DOXYCYCLINE HYCLATE 100 MG/1
100 CAPSULE ORAL 2 TIMES DAILY
Qty: 14 CAPSULE | Refills: 0 | Status: SHIPPED | OUTPATIENT
Start: 2023-01-04 | End: 2023-01-11

## 2023-01-05 NOTE — DISCHARGE INSTRUCTIONS
Doxycyline 1 tablet twice per day for 7 days with food and large glass of water  Flonase nasal spray 2 sprays in each nostril daily for 2 weeks  Push fluids  Steam showers  Return for any new/worsening symptoms

## 2023-02-08 LAB
AMB EXT CHOL/HDL RATIO: 4.3
AMB EXT CHOLESTEROL, TOTAL: 196 MG/DL
AMB EXT HDL CHOLESTEROL: 46 MG/DL
AMB EXT HGBA1C: 5.2 %
AMB EXT LDL CHOLESTEROL, DIRECT: 100 MG/DL
AMB EXT TRIGLYCERIDES: 252 MG/DL

## 2023-03-01 ENCOUNTER — TELEPHONE (OUTPATIENT)
Dept: INTERNAL MEDICINE CLINIC | Facility: CLINIC | Age: 33
End: 2023-03-01

## 2023-03-01 NOTE — TELEPHONE ENCOUNTER
Results received from [facility] as a workplace wellness evaluation as patient was identified as needing follow up with PCP for BP of 134/89, BMI of 32 and triglyceride level of 252. Last PE with  10/8/21. Results entered manually, in 's bin for review.

## 2023-03-01 NOTE — TELEPHONE ENCOUNTER
Incoming (mail or fax):  fax  Received from:  [facility]  Documentation given to:  Triage results bin    Test results

## 2023-03-07 ENCOUNTER — OFFICE VISIT (OUTPATIENT)
Dept: INTERNAL MEDICINE CLINIC | Facility: CLINIC | Age: 33
End: 2023-03-07
Payer: COMMERCIAL

## 2023-03-07 VITALS
BODY MASS INDEX: 31.54 KG/M2 | DIASTOLIC BLOOD PRESSURE: 70 MMHG | OXYGEN SATURATION: 98 % | RESPIRATION RATE: 20 BRPM | HEART RATE: 78 BPM | SYSTOLIC BLOOD PRESSURE: 110 MMHG | HEIGHT: 63 IN | WEIGHT: 178 LBS | TEMPERATURE: 98 F

## 2023-03-07 DIAGNOSIS — F32.9 REACTIVE DEPRESSION: ICD-10-CM

## 2023-03-07 DIAGNOSIS — F41.9 ANXIETY: ICD-10-CM

## 2023-03-07 DIAGNOSIS — Z00.00 ENCOUNTER FOR ANNUAL PHYSICAL EXAM: Primary | ICD-10-CM

## 2023-03-07 DIAGNOSIS — Z13.29 SCREENING FOR THYROID DISORDER: ICD-10-CM

## 2023-03-07 DIAGNOSIS — Z13.220 SCREENING FOR CHOLESTEROL LEVEL: ICD-10-CM

## 2023-03-07 PROCEDURE — 3074F SYST BP LT 130 MM HG: CPT | Performed by: NURSE PRACTITIONER

## 2023-03-07 PROCEDURE — 99395 PREV VISIT EST AGE 18-39: CPT | Performed by: NURSE PRACTITIONER

## 2023-03-07 PROCEDURE — 99214 OFFICE O/P EST MOD 30 MIN: CPT | Performed by: NURSE PRACTITIONER

## 2023-03-07 PROCEDURE — 3078F DIAST BP <80 MM HG: CPT | Performed by: NURSE PRACTITIONER

## 2023-03-07 PROCEDURE — 3008F BODY MASS INDEX DOCD: CPT | Performed by: NURSE PRACTITIONER

## 2023-03-07 RX ORDER — HYDROXYZINE HYDROCHLORIDE 25 MG/1
25 TABLET, FILM COATED ORAL EVERY 8 HOURS PRN
Qty: 21 TABLET | Refills: 0 | Status: SHIPPED | OUTPATIENT
Start: 2023-03-07

## 2023-08-24 NOTE — PATIENT INSTRUCTIONS
Monitor closely for any suicidal thoughts. Please call the 24-hour hotline for Lissette Ram at  if you have any suicidal thoughts. If you have suicidal thoughts with a plan please call 911 instead. My fitness pal maia for nutrition     See psych and therapy    Get your COVID booster if you would like    Use the hydroxyzine as needed for panic attacks or anxiety. Monitor for effectiveness and side effects. Do not drive while taking this as it can cause drowsiness. Low-fat diet and exercise    Get your labs completed in 3-months. You should be fasting for at least 10 hours. If you take a multivitamin with Biotin or any biotin product it should be held for 3 days prior to getting your labs done.      Make an appointment to see gynecology when you are due    Follow-up in 1 year or sooner as needed Please call patient with results.  Please relay my comments

## 2025-07-03 ENCOUNTER — OFFICE VISIT (OUTPATIENT)
Dept: INTERNAL MEDICINE CLINIC | Facility: CLINIC | Age: 35
End: 2025-07-03
Payer: COMMERCIAL

## 2025-07-03 VITALS
DIASTOLIC BLOOD PRESSURE: 82 MMHG | BODY MASS INDEX: 30.94 KG/M2 | HEART RATE: 72 BPM | RESPIRATION RATE: 16 BRPM | OXYGEN SATURATION: 99 % | HEIGHT: 63 IN | SYSTOLIC BLOOD PRESSURE: 126 MMHG | WEIGHT: 174.63 LBS | TEMPERATURE: 98 F

## 2025-07-03 DIAGNOSIS — M79.642 LEFT HAND PAIN: Primary | ICD-10-CM

## 2025-07-03 DIAGNOSIS — Z13.220 SCREENING FOR CHOLESTEROL LEVEL: ICD-10-CM

## 2025-07-03 DIAGNOSIS — Z13.29 SCREENING FOR THYROID DISORDER: ICD-10-CM

## 2025-07-03 DIAGNOSIS — Z00.00 PHYSICAL EXAM: ICD-10-CM

## 2025-07-03 DIAGNOSIS — T78.40XA ALLERGY, INITIAL ENCOUNTER: ICD-10-CM

## 2025-07-03 NOTE — PROGRESS NOTES
The following individual(s) verbally consented to be recorded using ambient AI listening technology and understand that they can each withdraw their consent to this listening technology at any point by asking the clinician to turn off or pause the recording:    Patient name: Roxi Edouard is a 35 year old female.  CHIEF COMPLAINT   Hand pain    HPI:     History of Present Illness  Roxi Edouard is a 35 year old female who presents with hand pain.    She has been experiencing hand pain since June, which began about a week before a visit to [facility]. The pain was intermittent and described as annoying, with a maximum intensity of 4 to 5 out of 10. Movement, particularly during golfing, exacerbated the pain.    The pain is located between the joints of the hand, not in the wrist or lower hand. There is no associated numbness or tingling in the hand or wrist. She initially thought she might be gripping something at night, contributing to the pain.    Over the past few weeks, the pain has improved and is now very slight and not bothersome. She has not engaged in any new or unusual activities that might have contributed to the pain, such as gardening or knitting. Her computer usage remains normal, and there have been no changes in her routine activities.     She also had an event a couple weeks ago after drinking a beer and having steak where her lips swelled. No tongue swelling. No sob or difficulty swallowing. It went away on its own. It has never happened before.          Current Medications[1]   Past Medical History[2]   Social History:  Short Social Hx on File[3]     REVIEW OF SYSTEMS:   See HPI     EXAM:     /82 (BP Location: Right arm, Patient Position: Sitting, Cuff Size: adult)   Pulse 72   Temp 98 °F (36.7 °C) (Temporal)   Resp 16   Ht 5' 3\" (1.6 m)   Wt 174 lb 9.6 oz (79.2 kg)   SpO2 99%   BMI 30.93 kg/m²   Body mass index is 30.93 kg/m².   GENERAL: well  developed, well nourished,in no apparent distress  HEENT: atraumatic, normocephalic   LUNGS: clear to auscultation; no rhonchi, rales, or wheezing  CARDIO: RRR without murmur  GI:  no masses, organomegaly or tenderness  MUSCULOSKELETAL: No obvious joint deformity or swelling. Minimal discomfort to the left hand with palpation.  Normal gait.  EXTREMITIES: no edema or calve tenderness   NEURO: Oriented times three       LABS:      Lab Results   Component Value Date    WBC 14.7 (H) 06/04/2021    RBC 5.30 06/04/2021    HGB 15.4 06/04/2021    HCT 47.1 06/04/2021    MCV 88.9 06/04/2021    MCH 29.1 06/04/2021    MCHC 32.7 06/04/2021    RDW 12.5 06/04/2021    .0 (H) 06/04/2021      Lab Results   Component Value Date    GLU 82 07/28/2020    BUN 7 07/28/2020    BUNCREA 9.2 (L) 07/28/2020    CREATSERUM 0.76 07/28/2020    ANIONGAP 5 07/28/2020    GFRNAA 107 07/28/2020    GFRAA 123 07/28/2020    CA 8.9 07/28/2020    OSMOCALC 283 07/28/2020    ALKPHO 55 07/28/2020    AST 15 07/28/2020    ALT 29 07/28/2020    BILT 0.5 07/28/2020    TP 8.0 07/28/2020    ALB 4.3 07/28/2020    GLOBULIN 3.7 07/28/2020     07/28/2020    K 3.6 07/28/2020     07/28/2020    CO2 26.0 07/28/2020      Lab Results   Component Value Date    CHOLEST 196 02/08/2023    TRIG 252 02/08/2023    HDL 46 02/08/2023     (H) 07/28/2020    VLDL 33 05/09/2021    TCHDLRATIO 4.30 02/08/2023    NONHDLC 143 05/09/2021      Lab Results   Component Value Date    TSH 1.380 07/28/2020      Lab Results   Component Value Date    A1C 5.2 02/08/2023        IMAGING:     No results found.     ASSESSMENT AND PLAN:   1. Left hand pain  - Intermittent hand pain since mid-June, likely due to a minor sprain or strain. Pain was initially moderate (4-5/10) and has improved over the past few weeks. No numbness or tingling reported. Pain localized to the joint area, possibly exacerbated by activities such as golfing. Differential includes arthritis, given the location  and nature of the pain, but less likely due to her age. No signs of infection or severe injury. Current improvement suggests resolution within a few weeks.  - Apply ice to the affected area once or twice daily with a barrier between skin and ice pack.  - Consider using Voltaren gel for anti-inflammatory effects without gastrointestinal side effects.  - Use a thumb-specific wrist splint at night to immobilize the area and promote healing.  - If pain worsens, consider hand x-ray and referral to a hand specialist.    2. Allergy, initial encounter  - had lip swelling a couple weeks ago. It resolved. No sob, difficulty swallowing.   - check food allergy panel  - have benadryl available as needed  - to ER as needed   - Adult Food Allergy Prof [E]; Future    3. Screening for cholesterol level  - Lipid Panel; Future    4. Screening for thyroid disorder  - TSH W Reflex To Free T4; Future    5. Physical exam  - CBC With Differential With Platelet; Future  - Comp Metabolic Panel (14); Future     The patient indicates understanding of these issues and agrees to the plan.  The patient is asked to return in a few months for a PE, sooner as needed.         [1]   Current Outpatient Medications   Medication Sig Dispense Refill    Amphetamine-Dextroamphet ER (ADDERALL XR) 15 MG Oral Capsule SR 24 Hr Take 1 capsule (15 mg total) by mouth daily. 30 capsule 0    cloNIDine 0.1 MG Oral Tab Take 0.1mg (1 tablet) before bedtime. If blood pressure is less than 100 systolic and/or HR is less than 70, hold medication. 90 tablet 2    escitalopram 20 MG Oral Tab Take 1 tablet (20 mg total) by mouth daily. 90 tablet 2    ALPRAZolam 0.5 MG Oral Tab Take 1 tablet (0.5 mg total) by mouth daily as needed. 30 tablet 1    hydrOXYzine 25 MG Oral Tab Take 1 tablet (25 mg total) by mouth every 8 (eight) hours as needed for Itching or Anxiety. 21 tablet 0    Multiple Vitamins-Minerals (MULTIVITAMIN GUMMIES WOMENS) Oral Chew Tab Chew 2 tablets by mouth  daily.      Probiotic Oral Cap Take 1 capsule by mouth daily as needed.       [2]   Past Medical History:   ADHD (attention deficit hyperactivity disorder)    Anxiety    Depression    Fetal intolerance to labor, delivered, current hospitalization ([facility])    Postpartum depression, current hospitalization    no meds at this time   [3]   Social History  Socioeconomic History    Marital status: Single   Tobacco Use    Smoking status: Former     Current packs/day: 0.00     Types: Cigarettes     Quit date: 1/8/2010     Years since quitting: 15.4    Smokeless tobacco: Never   Vaping Use    Vaping status: Never Used   Substance and Sexual Activity    Alcohol use: Yes     Comment: OCC    Drug use: No    Sexual activity: Yes     Partners: Male     Birth control/protection: I.U.D.   Other Topics Concern    Caffeine Concern Yes    Exercise Yes     Comment: 3-4 d/wk    Seat Belt Yes

## 2025-07-03 NOTE — PATIENT INSTRUCTIONS
Get your labs done. You should be fasting for at least 10 hours. If you take a multivitamin with Biotin or any biotin product it should be held for 3 days prior to getting your labs done.     Follow up in a few months for physical or sooner as needed    VISIT SUMMARY:  You visited us today due to hand pain that started in June and has been improving. We also discussed your recent unintentional weight loss.    YOUR PLAN:  HAND PAIN: You have been experiencing intermittent hand pain since mid-June, likely due to a minor sprain or strain. The pain has been improving and is now very slight.  -Apply ice to the affected area once or twice daily with a barrier between your skin and the ice pack.  -Consider using Voltaren gel for anti-inflammatory effects without gastrointestinal side effects.  -Use a thumb-specific wrist splint at night to immobilize the area and promote healing.  -If the pain worsens, we may need to do a hand x-ray and refer you to a hand specialist.

## (undated) NOTE — LETTER
Jeanine Luke YOB: 1990    CONSENT FOR PROCEDURE/SEDATION    1. I authorize the performance upon Jeanine Luke  the following: IUD Mirena    2.  I authorize Dr. Chrissy Kapadia DO (and whomever is designated as the doctor’s assistant), to perform Witness: _________________________________________ Date:___________     Physician Signature: _______________________________ Date:___________

## (undated) NOTE — Clinical Note
Continue care with Dr. Danna Prasad Follow up in 10-12 weeks to assess growth and evaluate the fetal heart

## (undated) NOTE — LETTER
[facility]  [address]akoandres Hetrisha 63 [address] [facility], [address] [place] [address]    Consent for Operation    Date: __________________    Time: _______________    1.  I authorize the performance upon Srinivas Cooper the following operation:                              Primary C videotape. The \[facility]\"" will not be responsible for storage or maintenance of this tape. 6. For the purpose of advancing medical education, I consent to the admittance of observers to the Operating Room.     7. I authorize the use of any specimen, organs Signature of Patient:   ___________________________    When the patient is a minor or mentally incompetent to give consent:  Signature of person authorized to consent for patient: ___________________________   Relationship to patient: _____________________ · If I am allergic to anything or have had a reaction to anesthesia before. 3. I understand how the anesthesia medicine will help me (benefits). 4. I understand that with any type of anesthesia medicine there are risks:  a.  The most common risks are: their representative has agreed to have anesthesia services.     _____________________________________________________________________________  Witness        Date   Time  I have verified that the signature is that of the patient or patient’s representative

## (undated) NOTE — MR AVS SNAPSHOT
[facility]ong  [address], [facility] [address]  [address] [phone]               Thank you for choosing us for your health care visit with Rich Santacruz MD.  We are glad to serve you and happy to provide you with this sum Culture Urine    Complete by: May 08, 2017 (Approximate)    Assoc Dx:  Encounter for supervision of normal first pregnancy in first trimester [Z34.01]           Culture Urine    Complete by:   May 08, 2017 (Approximate)    Assoc Dx:  Encounter for Ludwin Jorge Luis PROTEIN (URINE DIPSTICK) neg Negative/Trace mg/dL    UROBILINOGEN,SEMI-QN 0.2 0.0 - 1.9 mg/dL    NITRITE, URINE neg Negative    LEUKOCYTES neg Negative    APPEARANCE clear Clear    URINE-COLOR yellow Yellow    Multistix Lot# [id number] Numeric    Multistix Ex

## (undated) NOTE — MR AVS SNAPSHOT
[address]  [address] [phone]               Thank you for choosing us for your health care visit with Nicolas Portillo MD.  We are glad to serve you and happy to provide you with this sum PRENATAL FA OR   Take by mouth. Results of Recent Testing     US OB FOLLOW UP SPECIFIC CONDITION PER [facility]ETUS [facility] ONLY             MyChart     Sign up for [facility], your secure online medical record.   [facility] will allow you to access patient Start activities slowly and build up over time Do what you like   Get your heart pumping – brisk walking, biking, swimming Even 10 minute increments are effective and add up over the week   2 ½ hours per week – spread out over time Use a cj to keep you

## (undated) NOTE — LETTER
[facility]  Yesenia Hetrisha 63 [address] [facility], [address] [place] [address]    Consent for Operation    Date: __________________    Time: _______________    1.  I authorize the performance upon Nina Luis Miguel the following operation:                              Primary C videotape. [facility] will not be responsible for storage or maintenance of this tape. 6. For the purpose of advancing medical education, I consent to the admittance of observers to the Operating Room.     7. I authorize the use of any specimen, organs Signature of Patient:   ___________________________    When the patient is a minor or mentally incompetent to give consent:  Signature of person authorized to consent for patient: ___________________________   Relationship to patient: _____________________ · If I am allergic to anything or have had a reaction to anesthesia before. 3. I understand how the anesthesia medicine will help me (benefits). 4. I understand that with any type of anesthesia medicine there are risks:  a.  The most common risks are: their representative has agreed to have anesthesia services.     _____________________________________________________________________________  Witness        Date   Time  I have verified that the signature is that of the patient or patient’s representative